# Patient Record
Sex: MALE | Race: ASIAN | Employment: FULL TIME | URBAN - METROPOLITAN AREA
[De-identification: names, ages, dates, MRNs, and addresses within clinical notes are randomized per-mention and may not be internally consistent; named-entity substitution may affect disease eponyms.]

---

## 2018-04-19 ENCOUNTER — OFFICE VISIT (OUTPATIENT)
Dept: FAMILY MEDICINE | Facility: CLINIC | Age: 28
End: 2018-04-19
Payer: COMMERCIAL

## 2018-04-19 VITALS
TEMPERATURE: 97.5 F | RESPIRATION RATE: 18 BRPM | HEIGHT: 69 IN | DIASTOLIC BLOOD PRESSURE: 78 MMHG | BODY MASS INDEX: 28.73 KG/M2 | SYSTOLIC BLOOD PRESSURE: 128 MMHG | OXYGEN SATURATION: 99 % | WEIGHT: 194 LBS | HEART RATE: 79 BPM

## 2018-04-19 DIAGNOSIS — Z00.00 ROUTINE HISTORY AND PHYSICAL EXAMINATION OF ADULT: Primary | ICD-10-CM

## 2018-04-19 DIAGNOSIS — J98.9 REACTIVE AIRWAY DISEASE THAT IS NOT ASTHMA: ICD-10-CM

## 2018-04-19 PROCEDURE — 99385 PREV VISIT NEW AGE 18-39: CPT | Performed by: INTERNAL MEDICINE

## 2018-04-19 RX ORDER — ALBUTEROL SULFATE 90 UG/1
2 AEROSOL, METERED RESPIRATORY (INHALATION) EVERY 6 HOURS PRN
Qty: 1 INHALER | Refills: 2 | Status: SHIPPED | OUTPATIENT
Start: 2018-04-19 | End: 2020-02-27

## 2018-04-19 RX ORDER — ALBUTEROL SULFATE 90 UG/1
2 AEROSOL, METERED RESPIRATORY (INHALATION) EVERY 6 HOURS
COMMUNITY
End: 2019-11-26

## 2018-04-19 ASSESSMENT — ENCOUNTER SYMPTOMS
BACK PAIN: 1
SORE THROAT: 0
HEADACHES: 0
ARTHRALGIAS: 0
NUMBNESS: 0
CONSTIPATION: 0
EYE ITCHING: 0
LIGHT-HEADEDNESS: 0
FATIGUE: 0
VOMITING: 0
NERVOUS/ANXIOUS: 0
NECK PAIN: 0
SHORTNESS OF BREATH: 0
NAUSEA: 0
DIZZINESS: 0
EYE PAIN: 0
COUGH: 1
MYALGIAS: 0
DIARRHEA: 0
DIFFICULTY URINATING: 0
FEVER: 0
EYE REDNESS: 0
ABDOMINAL PAIN: 1
WHEEZING: 0
SLEEP DISTURBANCE: 0
CHILLS: 0
BLOOD IN STOOL: 0
PALPITATIONS: 0
TROUBLE SWALLOWING: 0

## 2018-04-19 NOTE — PROGRESS NOTES
SUBJECTIVE:   CC: Skyler York is an 27 year old male who presents for preventative health visit.       Patient complains of nonproductive cough which specifically occurs whenever he goes out of his office to head towards the parking lot for his car.  Upon further questioning, this apparently started when the weather got colder (last year fall season).  As soon as he returns to a warmer environment, the cough resolves.  Denies shortness of breath or chest pain.  No night sweats, fever/chills, or weight loss.  He does have a history of childhood asthma.        Healthy Habits:    Do you get at least three servings of calcium containing foods daily (dairy, green leafy vegetables, etc.)? yes    Amount of exercise or daily activities, outside of work: 3 day(s) per week    Problems taking medications regularly No    Medication side effects: No    Have you had an eye exam in the past two years? yes    Do you see a dentist twice per year? yes    Do you have sleep apnea, excessive snoring or daytime drowsiness?no       Today's PHQ-2 Score:   PHQ-2 ( 1999 [employer]) 4/19/2018   Q1: Little interest or pleasure in doing things 0   Q2: Feeling down, depressed or hopeless 0   PHQ-2 Score 0       Abuse: Current or Past(Physical, Sexual or Emotional)- No  Do you feel safe in your environment - Yes     If you drink alcohol do you typically have >3 drinks per day or >7 drinks per week? No                      Reviewed orders with patient. Reviewed health maintenance and updated orders accordingly - Yes    Reviewed and updated as needed this visit by clinical staff  Tobacco  Allergies  Meds  Problems  Med Hx  Surg Hx  Fam Hx  Soc Hx        Reviewed and updated as needed this visit by Provider  Allergies  Meds  Problems        Past Medical History:   Diagnosis Date     Allergic rhinitis      Childhood asthma      Past Surgical History:   Procedure Laterality Date     NO HISTORY OF SURGERY         Family History   Problem  "Relation Age of Onset     Hypertension Mother      Colon Cancer Father      60's     DIABETES Paternal Uncle      Hypertension Paternal Uncle      Breast Cancer Paternal Aunt      Coronary Artery Disease No family hx of      CEREBROVASCULAR DISEASE No family hx of      Prostate Cancer No family hx of      Other Cancer No family hx of        Social History   Substance Use Topics     Smoking status: Never Smoker     Smokeless tobacco: Never Used     Alcohol use No       ROS:  Review of Systems   Constitutional: Negative for chills, fatigue and fever.   HENT: Negative for congestion, ear pain, hearing loss, sore throat and trouble swallowing.    Eyes: Positive for visual disturbance (corneal disorder -- follows up with eye specialist). Negative for pain, redness and itching.   Respiratory: Positive for cough (when out in cold wheather). Negative for shortness of breath and wheezing.    Cardiovascular: Negative for chest pain and palpitations.   Gastrointestinal: Positive for abdominal pain (when consuming certain dairy products). Negative for blood in stool, constipation, diarrhea, nausea and vomiting.   Genitourinary: Negative for difficulty urinating and testicular pain.   Musculoskeletal: Positive for back pain (occasional -- relieved by Aleve, has history of lumbar herniated disk, had PT in the past, not limiting daily activites). Negative for arthralgias, myalgias and neck pain.   Skin: Negative for rash.   Neurological: Negative for dizziness, light-headedness, numbness and headaches.   Psychiatric/Behavioral: Negative for sleep disturbance. The patient is not nervous/anxious.        OBJECTIVE:   /78 (BP Location: Right arm, Patient Position: Chair, Cuff Size: Adult Large)  Pulse 79  Temp 97.5  F (36.4  C) (Oral)  Resp 18  Ht 5' 9\" (1.753 m)  Wt 194 lb (88 kg)  SpO2 99%  BMI 28.65 kg/m2  EXAM:  Physical Exam   Constitutional: He is oriented to person, place, and time. No distress.   HENT:   Right " Ear: External ear normal.   Left Ear: External ear normal.   Nose: Nose normal.   Mouth/Throat: Oropharynx is clear and moist. No oropharyngeal exudate.   Eyes: Conjunctivae are normal. Pupils are equal, round, and reactive to light.   Neck: Normal range of motion. Neck supple. No thyromegaly present.   Cardiovascular: Normal rate, regular rhythm and normal heart sounds.    Pulmonary/Chest: Effort normal and breath sounds normal. No respiratory distress.   Abdominal: Soft. There is no tenderness.   Genitourinary: Penis normal.   Musculoskeletal: Normal range of motion. He exhibits no edema or tenderness.   Lymphadenopathy:     He has no cervical adenopathy.   Neurological: He is alert and oriented to person, place, and time. He has normal reflexes. Coordination normal.   Psychiatric: He has a normal mood and affect. Judgment normal.   Vitals reviewed.      ASSESSMENT/PLAN:       ICD-10-CM    1. Routine history and physical examination of adult Z00.00    2. Reactive airway disease that is not asthma R09.89 albuterol (PROAIR HFA/PROVENTIL HFA/VENTOLIN HFA) 108 (90 Base) MCG/ACT Inhaler       Patient Instructions   Use prescribed inhaler during your coughing spells.    Call doctor if your cough persist/worsens, or if you develop new symptoms or side effects from the inhaler.    Maintain low fat/calorie diet and regular exercise.    Follow up every 2 years or as needed.        Preventive Health Recommendations  Male Ages 26 - 39    Yearly exam:             See your health care provider every year in order to  o   Review health changes.   o   Discuss preventive care.    o   Review your medicines if your doctor has prescribed any.    You should be tested each year for STDs (sexually transmitted diseases), if you re at risk.     After age 35, talk to your provider about cholesterol testing. If you are at risk for heart disease, have your cholesterol tested at least every 5 years.     If you are at risk for diabetes, you  "should have a diabetes test (fasting glucose).  Shots: Get a flu shot each year. Get a tetanus shot every 10 years.     Nutrition:    Eat at least 5 servings of fruits and vegetables daily.     Eat whole-grain bread, whole-wheat pasta and brown rice instead of white grains and rice.     Talk to your provider about Calcium and Vitamin D.     Lifestyle    Exercise for at least 150 minutes a week (30 minutes a day, 5 days a week). This will help you control your weight and prevent disease.     Limit alcohol to one drink per day.     No smoking.     Wear sunscreen to prevent skin cancer.     See your dentist every six months for an exam and cleaning.       COUNSELING:  Reviewed preventive health counseling, as reflected in patient instructions       reports that he has never smoked. He has never used smokeless tobacco.    Estimated body mass index is 28.65 kg/(m^2) as calculated from the following:    Height as of this encounter: 5' 9\" (1.753 m).    Weight as of this encounter: 194 lb (88 kg).       Counseling Resources:  ATP IV Guidelines  Pooled Cohorts Equation Calculator  FRAX Risk Assessment  ICSI Preventive Guidelines  Dietary Guidelines for Americans, 2010  USDA's MyPlate  ASA Prophylaxis  Lung CA Screening    Gal Lopez MD  [facility]    "

## 2018-04-19 NOTE — MR AVS SNAPSHOT
After Visit Summary   4/19/2018    Skyler York    MRN: [id number]           Patient Information     Date Of Birth          1990        Visit Information        Provider Department      4/19/2018 4:00 PM Gal Lopez MD [facility]        Today's Diagnoses     Routine history and physical examination of adult    -  1    Reactive airway disease that is not asthma          Care Instructions    Use prescribed inhaler during your coughing spells.    Call doctor if your cough persist/worsens, or if you develop new symptoms or side effects from the inhaler.    Maintain low fat/calorie diet and regular exercise.    Follow up every 2 years or as needed.        Preventive Health Recommendations  Male Ages 26 - 39    Yearly exam:             See your health care provider every year in order to  o   Review health changes.   o   Discuss preventive care.    o   Review your medicines if your doctor has prescribed any.    You should be tested each year for STDs (sexually transmitted diseases), if you re at risk.     After age 35, talk to your provider about cholesterol testing. If you are at risk for heart disease, have your cholesterol tested at least every 5 years.     If you are at risk for diabetes, you should have a diabetes test (fasting glucose).  Shots: Get a flu shot each year. Get a tetanus shot every 10 years.     Nutrition:    Eat at least 5 servings of fruits and vegetables daily.     Eat whole-grain bread, whole-wheat pasta and brown rice instead of white grains and rice.     Talk to your provider about Calcium and Vitamin D.     Lifestyle    Exercise for at least 150 minutes a week (30 minutes a day, 5 days a week). This will help you control your weight and prevent disease.     Limit alcohol to one drink per day.     No smoking.     Wear sunscreen to prevent skin cancer.     See your dentist every six months for an exam and cleaning.             Follow-ups after  "your visit        Who to contact     If you have questions or need follow up information about today's clinic visit or your schedule please contact [facility] directly at [phone].  Normal or non-critical lab and imaging results will be communicated to you by MyChart, letter or phone within 4 business days after the clinic has received the results. If you do not hear from us within 7 days, please contact the clinic through MyChart or phone. If you have a critical or abnormal lab result, we will notify you by phone as soon as possible.  Submit refill requests through MyRoll or call your pharmacy and they will forward the refill request to us. Please allow 3 business days for your refill to be completed.          Additional Information About Your Visit        [facility]     MyRoll lets you send messages to your doctor, view your test results, renew your prescriptions, schedule appointments and more. To sign up, go to [email or web address] . Click on \"Log in\" on the left side of the screen, which will take you to the Welcome page. Then click on \"Sign up Now\" on the right side of the page.     You will be asked to enter the access code listed below, as well as some personal information. Please follow the directions to create your username and password.     Your access code is: [email or web address]  Expires: 2018  4:44 PM     Your access code will  in 90 days. If you need help or a new code, please call your [facility] clinic or [phone].        Care EveryWhere ID     This is your Care EveryWhere ID. This could be used by other organizations to access your [facility] medical records  [id number]        Your Vitals Were     Pulse Temperature Respirations Height Pulse Oximetry BMI (Body Mass Index)    79 97.5  F (36.4  C) (Oral) 18 5' 9\" (1.753 m) 99% 28.65 kg/m2       Blood Pressure from Last 3 Encounters:   18 128/78    Weight from Last 3 Encounters:   18 194 lb (88 kg)    "           Today, you had the following     No orders found for display         Today's Medication Changes          These changes are accurate as of 4/19/18  4:44 PM.  If you have any questions, ask your nurse or doctor.               These medicines have changed or have updated prescriptions.        Dose/Directions    * albuterol 108 (90 Base) MCG/ACT Inhaler   Commonly known as:  PROAIR HFA/PROVENTIL HFA/VENTOLIN HFA   This may have changed:  Another medication with the same name was added. Make sure you understand how and when to take each.   Changed by:  Gal Lopez MD        Dose:  2 puff   Inhale 2 puffs into the lungs every 6 hours   Refills:  0       * albuterol 108 (90 Base) MCG/ACT Inhaler   Commonly known as:  PROAIR HFA/PROVENTIL HFA/VENTOLIN HFA   This may have changed:  You were already taking a medication with the same name, and this prescription was added. Make sure you understand how and when to take each.   Used for:  Reactive airway disease that is not asthma   Changed by:  Gal Lopez MD        Dose:  2 puff   Inhale 2 puffs into the lungs every 6 hours as needed for shortness of breath / dyspnea or wheezing   Quantity:  1 Inhaler   Refills:  2       * Notice:  This list has 2 medication(s) that are the same as other medications prescribed for you. Read the directions carefully, and ask your doctor or other care provider to review them with you.         Where to get your medicines      These medications were sent to [facility] [address], [place] [address]  [address], [address] [address]     Phone:  [phone]     albuterol 108 (90 Base) MCG/ACT Inhaler                Primary Care Provider Fax #    Physician No Ref-Primary [phone]       No address on file        Equal Access to Services     OMAR TORRES AH: delisa Glynn, jadiel masters  moncho poeshahidramírez cabralesjeffjeremy rustam. So [facility] [place] [phone].    ATENCIÓN: Si jorgela allen, tiene a pineda disposición servicios gratuitos de asistencia lingüística. Shaun al [phone].    We comply with applicable federal civil rights laws and [place] laws. We do not discriminate on the basis of race, color, national origin, age, disability, sex, sexual orientation, or gender identity.            Thank you!     Thank you for choosing [facility]  for your care. Our goal is always to provide you with excellent care. Hearing back from our patients is one way we can continue to improve our services. Please take a few minutes to complete the written survey that you may receive in the mail after your visit with us. Thank you!             Your Updated Medication List - Protect others around you: Learn how to safely use, store and throw away your medicines at www.disposemymeds.org.          This list is accurate as of 4/19/18  4:44 PM.  Always use your most recent med list.                   Brand Name Dispense Instructions for use Diagnosis    * albuterol 108 (90 Base) MCG/ACT Inhaler    PROAIR HFA/PROVENTIL HFA/VENTOLIN HFA     Inhale 2 puffs into the lungs every 6 hours        * albuterol 108 (90 Base) MCG/ACT Inhaler    PROAIR HFA/PROVENTIL HFA/VENTOLIN HFA    1 Inhaler    Inhale 2 puffs into the lungs every 6 hours as needed for shortness of breath / dyspnea or wheezing    Reactive airway disease that is not asthma       * Notice:  This list has 2 medication(s) that are the same as other medications prescribed for you. Read the directions carefully, and ask your doctor or other care provider to review them with you.

## 2018-04-19 NOTE — NURSING NOTE
"Chief Complaint   Patient presents with     Physical     Not Fasting       Initial /78 (BP Location: Right arm, Patient Position: Chair, Cuff Size: Adult Large)  Pulse 79  Temp 97.5  F (36.4  C) (Oral)  Resp 18  Ht 5' 9\" (1.753 m)  Wt 194 lb (88 kg)  SpO2 99%  BMI 28.65 kg/m2 Estimated body mass index is 28.65 kg/(m^2) as calculated from the following:    Height as of this encounter: 5' 9\" (1.753 m).    Weight as of this encounter: 194 lb (88 kg).  Medication Reconciliation: complete   Sara Lee CMA (AAMA)      "

## 2018-04-19 NOTE — PATIENT INSTRUCTIONS
Use prescribed inhaler during your coughing spells.    Call doctor if your cough persist/worsens, or if you develop new symptoms or side effects from the inhaler.    Maintain low fat/calorie diet and regular exercise.    Follow up every 2 years or as needed.        Preventive Health Recommendations  Male Ages 26 - 39    Yearly exam:             See your health care provider every year in order to  o   Review health changes.   o   Discuss preventive care.    o   Review your medicines if your doctor has prescribed any.    You should be tested each year for STDs (sexually transmitted diseases), if you re at risk.     After age 35, talk to your provider about cholesterol testing. If you are at risk for heart disease, have your cholesterol tested at least every 5 years.     If you are at risk for diabetes, you should have a diabetes test (fasting glucose).  Shots: Get a flu shot each year. Get a tetanus shot every 10 years.     Nutrition:    Eat at least 5 servings of fruits and vegetables daily.     Eat whole-grain bread, whole-wheat pasta and brown rice instead of white grains and rice.     Talk to your provider about Calcium and Vitamin D.     Lifestyle    Exercise for at least 150 minutes a week (30 minutes a day, 5 days a week). This will help you control your weight and prevent disease.     Limit alcohol to one drink per day.     No smoking.     Wear sunscreen to prevent skin cancer.     See your dentist every six months for an exam and cleaning.

## 2019-08-22 ENCOUNTER — OFFICE VISIT (OUTPATIENT)
Dept: URGENT CARE | Facility: URGENT CARE | Age: 29
End: 2019-08-22
Payer: COMMERCIAL

## 2019-08-22 VITALS
OXYGEN SATURATION: 99 % | BODY MASS INDEX: 28.21 KG/M2 | HEART RATE: 104 BPM | WEIGHT: 191 LBS | TEMPERATURE: 100.7 F | SYSTOLIC BLOOD PRESSURE: 130 MMHG | DIASTOLIC BLOOD PRESSURE: 88 MMHG

## 2019-08-22 DIAGNOSIS — J01.80 OTHER ACUTE SINUSITIS, RECURRENCE NOT SPECIFIED: ICD-10-CM

## 2019-08-22 DIAGNOSIS — H65.02 ACUTE SEROUS OTITIS MEDIA OF LEFT EAR, RECURRENCE NOT SPECIFIED: ICD-10-CM

## 2019-08-22 DIAGNOSIS — R07.0 THROAT PAIN: Primary | ICD-10-CM

## 2019-08-22 LAB
DEPRECATED S PYO AG THROAT QL EIA: NORMAL
SPECIMEN SOURCE: NORMAL

## 2019-08-22 PROCEDURE — 87880 STREP A ASSAY W/OPTIC: CPT | Performed by: FAMILY MEDICINE

## 2019-08-22 PROCEDURE — 87081 CULTURE SCREEN ONLY: CPT | Performed by: FAMILY MEDICINE

## 2019-08-22 PROCEDURE — 99213 OFFICE O/P EST LOW 20 MIN: CPT | Performed by: FAMILY MEDICINE

## 2019-08-22 RX ORDER — ACETAMINOPHEN 325 MG/1
325-650 TABLET ORAL EVERY 6 HOURS PRN
COMMUNITY
End: 2020-10-10

## 2019-08-22 RX ORDER — AMOXICILLIN 875 MG
875 TABLET ORAL 2 TIMES DAILY
Qty: 20 TABLET | Refills: 0 | Status: SHIPPED | OUTPATIENT
Start: 2019-08-22 | End: 2019-11-26

## 2019-08-22 NOTE — PATIENT INSTRUCTIONS
Patient Education     Sinusitis (Antibiotic Treatment)    The sinuses are air-filled spaces within the bones of the face. They connect to the inside of the nose. Sinusitis is an inflammation of the tissue that lines the sinuses. Sinusitis can occur during a cold. It can also happen due to allergies to pollens and other particles in the air. Sinusitis can cause symptoms of sinus congestion and a feeling of fullness. A sinus infection causes fever, headache, and facial pain. There is often green or yellow fluid draining from the nose or into the back of the throat (post-nasal drip). You have been given antibiotics to treat this condition.  Home care    Take the full course of antibiotics as instructed. Do not stop taking them, even when you feel better.    Drink plenty of water, hot tea, and other liquids. This may help thin nasal mucus. It also may help your sinuses drain fluids.    Heat may help soothe painful areas of your face. Use a towel soaked in hot water. Or,  the shower and direct the warm spray onto your face. Using a vaporizer along with a menthol rub at night may also help soothe symptoms.     An expectorant with guaifenesin may help thin nasal mucus and help your sinuses drain fluids.    You can use an over-the-counter decongestant, unless a similar medicine was prescribed to you. Nasal sprays work the fastest. Use one that contains phenylephrine or oxymetazoline. First blow your nose gently. Then use the spray. Do not use these medicines more often than directed on the label. If you do, your symptoms may get worse. You may also take pills that contain pseudoephedrine. Don t use products that combine multiple medicines. This is because side effects may be increased. Read labels. You can also ask the pharmacist for help. (People with high blood pressure should not use decongestants. They can raise blood pressure.)    Over-the-counter antihistamines may help if allergies contributed to your  sinusitis.      Do not use nasal rinses or irrigation during an acute sinus infection, unless your healthcare provider tells you to. Rinsing may spread the infection to other areas in your sinuses.    Use acetaminophen or ibuprofen to control pain, unless another pain medicine was prescribed to you. If you have chronic liver or kidney disease or ever had a stomach ulcer, talk with your healthcare provider before using these medicines. (Aspirin should never be taken by anyone under age 18 who is ill with a fever. It may cause severe liver damage.)    Don't smoke. This can make symptoms worse.  Follow-up care  Follow up with your healthcare provider or our staff if you are not better in 1 week.  When to seek medical advice  Call your healthcare provider if any of these occur:    Facial pain or headache that gets worse    Stiff neck    Unusual drowsiness or confusion    Swelling of your forehead or eyelids    Vision problems, such as blurred or double vision    Fever of 100.4 F (38 C) or higher, or as directed by your healthcare provider    Seizure    Breathing problems    Symptoms don't go away in 10 days  Prevention  Here are steps you can take to help prevent an infection:    Keep good hand washing habits.    Don t have close contact with people who have sore throats, colds, or other upper respiratory infections.    Don t smoke, and stay away from secondhand smoke.    Stay up to date with of your vaccines.  Date Last Reviewed: 11/1/2017 2000-2018 [employer]. [address] [place] [address]. All rights reserved. This information is not intended as a substitute for professional medical care. Always follow your healthcare professional's instructions.           Patient Education     Middle Ear Infection (Adult)  You have an infection of the middle ear, the space behind the eardrum. This is also called acute otitis media (AOM). Sometimes it is caused by the common cold. This is because  congestion can block the internal passage (eustachian tube) that drains fluid from the middle ear. When the middle ear fills with fluid, bacteria can grow there and cause an infection. Oral antibiotics are used to treat this illness, not ear drops. Symptoms usually start to improve within 1 to 2 days of treatment.    Home care  The following are general care guidelines:    Finish all of the antibiotic medicine given, even though you may feel better after the first few days.    You may use over-the-counter medicine, such as acetaminophen or ibuprofen, to control pain and fever, unless something else was prescribed. If you have chronic liver or kidney disease or have ever had a stomach ulcer or gastrointestinal bleeding, talk with your healthcare provider before using these medicines. Do not give aspirin to anyone under 18 years of age who has a fever. It may cause severe illness or death.  Follow-up care  Follow up with your healthcare provider, or as advised, in 2 weeks if all symptoms have not gotten better, or if hearing doesn't go back to normal within 1 month.  When to seek medical advice  Call your healthcare provider right away if any of these occur:    Ear pain gets worse or does not improve after 3 days of treatment    Unusual drowsiness or confusion    Neck pain, stiff neck, or headache    Fluid or blood draining from the ear canal    Fever of 100.4 F (38 C) or as advised     Seizure  Date Last Reviewed: 6/1/2016 2000-2018 [employer]. [address] [place] [address]. All rights reserved. This information is not intended as a substitute for professional medical care. Always follow your healthcare professional's instructions.

## 2019-08-23 LAB
BACTERIA SPEC CULT: NORMAL
SPECIMEN SOURCE: NORMAL

## 2019-08-26 NOTE — PROGRESS NOTES
SUBJECTIVE:  Skyler York, a 28 year old male scheduled an appointment to discuss the following issues:     Throat pain  Other acute sinusitis, recurrence not specified  Acute serous otitis media of left ear, recurrence not specified    Medical, social, surgical, and family histories reviewed.    Urgent Care    Pharyngitis (sore throat,fever,body aches,chills,headache,sinus pain)   Patient has had sore throat and fever up to 101.4  F, myalgia, nausea, decreased appetite.  No sick exposure, but he has been riding the bus to work.  No history of diabetes.  Patient does have asthma, last used inhaler 6 months ago.  No cough.    ROS:  See HPI.  No nausea/vomiting.   No chest pain/SOB.  No BM/urine problems.  No dizziness or syncope.      OBJECTIVE:  /88   Pulse 104   Temp 100.7  F (38.2  C) (Oral)   Wt 86.6 kg (191 lb)   SpO2 99%   BMI 28.21 kg/m    EXAM:  GENERAL APPEARANCE: alert and mild distress; low-grade temp, mildly tachycardic, not hypotensive, not septic, moist mucous membrane; no cyanosis or accessory muscle use; no meningeal signs  EYES: Eyes grossly normal to inspection, PERRL and conjunctivae and sclerae normal  HENT: ear canals normal; left tympanic membrane erythematous and somewhat opaque; right tympanic membrane normal; inflamed nasal mucosa with bilateral maxillary sinus tenderness; mouth without ulcers or lesions; erythematous throat but no exudate  NECK: no adenopathy, no asymmetry, masses, or scars and neck normal to palpation  RESP: lungs clear to auscultation - no rales, rhonchi or wheezes  CV: regular rates and rhythm, normal S1 S2, no S3 or S4 and no murmur, click or rub  LYMPHATICS: no cervical adenopathy  ABDOMEN: soft, nontender, without hepatosplenomegaly or masses and bowel sounds normal  MS: extremities normal- no gross deformities noted  SKIN: no suspicious lesions or rashes  NEURO: Normal strength and tone, mentation intact and speech normal    ASSESSMENT/PLAN:  (R07.0) Throat  pain  (primary encounter diagnosis)  Comment: strep negative  Plan: Rapid strep screen, Beta strep group A culture        (J01.80) Other acute sinusitis, recurrence not specified  Plan: amoxicillin (AMOXIL) 875 MG tablet       (H65.02) Acute serous otitis media of left ear, recurrence not specified  Plan: amoxicillin (AMOXIL) 875 MG tablet  Care instructions given.  Fluids/rest.  Pt to f/up PCP if no improvement or worsening.  Warning signs and symptoms explained.

## 2019-11-26 ENCOUNTER — OFFICE VISIT (OUTPATIENT)
Dept: FAMILY MEDICINE | Facility: CLINIC | Age: 29
End: 2019-11-26
Payer: COMMERCIAL

## 2019-11-26 VITALS
HEART RATE: 84 BPM | WEIGHT: 192 LBS | TEMPERATURE: 98.5 F | BODY MASS INDEX: 28.44 KG/M2 | OXYGEN SATURATION: 100 % | SYSTOLIC BLOOD PRESSURE: 120 MMHG | HEIGHT: 69 IN | DIASTOLIC BLOOD PRESSURE: 72 MMHG

## 2019-11-26 DIAGNOSIS — Z00.00 ROUTINE HISTORY AND PHYSICAL EXAMINATION OF ADULT: Primary | ICD-10-CM

## 2019-11-26 LAB
BASOPHILS # BLD AUTO: 0 10E9/L (ref 0–0.2)
BASOPHILS NFR BLD AUTO: 0.2 %
DIFFERENTIAL METHOD BLD: NORMAL
EOSINOPHIL # BLD AUTO: 0.2 10E9/L (ref 0–0.7)
EOSINOPHIL NFR BLD AUTO: 2.6 %
ERYTHROCYTE [DISTWIDTH] IN BLOOD BY AUTOMATED COUNT: 12.9 % (ref 10–15)
HCT VFR BLD AUTO: 42.3 % (ref 40–53)
HGB BLD-MCNC: 14.1 G/DL (ref 13.3–17.7)
LYMPHOCYTES # BLD AUTO: 3.3 10E9/L (ref 0.8–5.3)
LYMPHOCYTES NFR BLD AUTO: 40.1 %
MCH RBC QN AUTO: 29.3 PG (ref 26.5–33)
MCHC RBC AUTO-ENTMCNC: 33.3 G/DL (ref 31.5–36.5)
MCV RBC AUTO: 88 FL (ref 78–100)
MONOCYTES # BLD AUTO: 0.7 10E9/L (ref 0–1.3)
MONOCYTES NFR BLD AUTO: 8.7 %
NEUTROPHILS # BLD AUTO: 3.9 10E9/L (ref 1.6–8.3)
NEUTROPHILS NFR BLD AUTO: 48.4 %
PLATELET # BLD AUTO: 232 10E9/L (ref 150–450)
RBC # BLD AUTO: 4.81 10E12/L (ref 4.4–5.9)
WBC # BLD AUTO: 8.1 10E9/L (ref 4–11)

## 2019-11-26 PROCEDURE — 99395 PREV VISIT EST AGE 18-39: CPT | Performed by: INTERNAL MEDICINE

## 2019-11-26 PROCEDURE — 36415 COLL VENOUS BLD VENIPUNCTURE: CPT | Performed by: INTERNAL MEDICINE

## 2019-11-26 PROCEDURE — 85025 COMPLETE CBC W/AUTO DIFF WBC: CPT | Performed by: INTERNAL MEDICINE

## 2019-11-26 PROCEDURE — 80053 COMPREHEN METABOLIC PANEL: CPT | Performed by: INTERNAL MEDICINE

## 2019-11-26 ASSESSMENT — MIFFLIN-ST. JEOR: SCORE: 1826.29

## 2019-11-26 NOTE — PROGRESS NOTES
SUBJECTIVE:   CC: Skyler York is an 29 year old male who presents for preventative health visit.       Healthy Habits:     Getting at least 3 servings of Calcium per day:  Yes    Bi-annual eye exam:  Yes    Dental care twice a year:  Yes    Sleep apnea or symptoms of sleep apnea:  None    Diet:  Regular (no restrictions)    Frequency of exercise:  4-5 days/week    Duration of exercise:  45-60 minutes    Taking medications regularly:  Not Applicable    Barriers to taking medications:  None    Medication side effects:  None    PHQ-2 Total Score: 0    Additional concerns today:  No      Today's PHQ-2 Score:   PHQ-2 ( 1999 Pf[employer]) 11/26/2019   Q1: Little interest or pleasure in doing things 0   Q2: Feeling down, depressed or hopeless 0   PHQ-2 Score 0       Abuse: Current or Past(Physical, Sexual or Emotional)- No  Do you feel safe in your environment? Yes        Social History     Tobacco Use     Smoking status: Never Smoker     Smokeless tobacco: Never Used   Substance Use Topics     Alcohol use: No         No flowsheet data found.    Last PSA: No results found for: PSA    Reviewed orders with patient. Reviewed health maintenance and updated orders accordingly - Yes      Reviewed and updated as needed this visit by clinical staff  Tobacco  Allergies  Meds  Problems  Med Hx  Surg Hx  Soc Hx        Reviewed and updated as needed this visit by Provider  Allergies  Meds  Problems  Med Hx  Surg Hx        Past Medical History:   Diagnosis Date     Allergic rhinitis      Childhood asthma        Past Surgical History:   Procedure Laterality Date     NO HISTORY OF SURGERY         Family History   Problem Relation Age of Onset     Hypertension Mother      Colon Cancer Father         60's     Diabetes Paternal Uncle      Hypertension Paternal Uncle      Breast Cancer Paternal Aunt      Coronary Artery Disease No family hx of      Cerebrovascular Disease No family hx of      Prostate Cancer No family hx of      Other  "Cancer No family hx of        Social History     Tobacco Use     Smoking status: Never Smoker     Smokeless tobacco: Never Used   Substance Use Topics     Alcohol use: No       Review of Systems   Constitutional: Negative for chills, fatigue and fever.   HENT: Negative for congestion, ear pain, hearing loss, sore throat and trouble swallowing.    Eyes: Negative for pain and visual disturbance.   Respiratory: Negative for cough and shortness of breath.    Cardiovascular: Negative for chest pain and palpitations.   Gastrointestinal: Negative for abdominal pain, blood in stool, constipation, diarrhea, nausea and vomiting.   Genitourinary: Negative for difficulty urinating and testicular pain.   Musculoskeletal: Negative for arthralgias, back pain, myalgias and neck pain.   Skin: Negative for rash.   Neurological: Negative for dizziness, light-headedness, numbness and headaches.   Psychiatric/Behavioral: Negative for sleep disturbance. The patient is not nervous/anxious.        OBJECTIVE:   /72 (BP Location: Right arm, Patient Position: Chair, Cuff Size: Adult Regular)   Pulse 84   Temp 98.5  F (36.9  C) (Tympanic)   Ht 1.753 m (5' 9\")   Wt 87.1 kg (192 lb)   SpO2 100%   BMI 28.35 kg/m      Physical Exam  Vitals signs and nursing note reviewed.   Constitutional:       General: He is not in acute distress.  HENT:      Right Ear: External ear normal.      Left Ear: External ear normal.   Eyes:      Conjunctiva/sclera: Conjunctivae normal.      Pupils: Pupils are equal, round, and reactive to light.   Neck:      Thyroid: No thyromegaly.   Cardiovascular:      Rate and Rhythm: Normal rate and regular rhythm.      Heart sounds: Normal heart sounds.   Pulmonary:      Effort: Pulmonary effort is normal. No respiratory distress.      Breath sounds: Normal breath sounds.   Abdominal:      Palpations: Abdomen is soft.      Tenderness: There is no abdominal tenderness.   Genitourinary:     Penis: No discharge.  " "  Musculoskeletal: Normal range of motion.         General: No tenderness.   Lymphadenopathy:      Cervical: No cervical adenopathy.   Skin:     Findings: No rash.   Neurological:      Mental Status: He is alert and oriented to person, place, and time.      Coordination: Coordination normal.       ASSESSMENT/PLAN:       ICD-10-CM    1. Routine history and physical examination of adult Z00.00 CBC with platelets differential     Comprehensive metabolic panel       Patient Instructions   Inform doctor if your bilateral knee and left shoulder pain persist/worsens, or if you develop new symptoms.    Maintain low fat/calorie diet and regular exercise.    Follow up yearly or as needed.      COUNSELING:   Reviewed preventive health counseling, as reflected in patient instructions    Estimated body mass index is 28.35 kg/m  as calculated from the following:    Height as of this encounter: 1.753 m (5' 9\").    Weight as of this encounter: 87.1 kg (192 lb).     Weight management plan: Discussed healthy diet and exercise guidelines     reports that he has never smoked. He has never used smokeless tobacco.      Counseling Resources:  ATP IV Guidelines  Pooled Cohorts Equation Calculator  FRAX Risk Assessment  ICSI Preventive Guidelines  Dietary Guidelines for Americans, 2010  USDA's MyPlate  ASA Prophylaxis  Lung CA Screening    Gal Lopez MD  [facility]  "

## 2019-11-26 NOTE — PATIENT INSTRUCTIONS
Inform doctor if your bilateral knee and left shoulder pain persist/worsens, or if you develop new symptoms.    Maintain low fat/calorie diet and regular exercise.    Follow up yearly or as needed.

## 2019-11-27 LAB
ALBUMIN SERPL-MCNC: 4 G/DL (ref 3.4–5)
ALP SERPL-CCNC: 80 U/L (ref 40–150)
ALT SERPL W P-5'-P-CCNC: 26 U/L (ref 0–70)
ANION GAP SERPL CALCULATED.3IONS-SCNC: 7 MMOL/L (ref 3–14)
AST SERPL W P-5'-P-CCNC: 21 U/L (ref 0–45)
BILIRUB SERPL-MCNC: 0.4 MG/DL (ref 0.2–1.3)
BUN SERPL-MCNC: 14 MG/DL (ref 7–30)
CALCIUM SERPL-MCNC: 8.9 MG/DL (ref 8.5–10.1)
CHLORIDE SERPL-SCNC: 107 MMOL/L (ref 94–109)
CO2 SERPL-SCNC: 26 MMOL/L (ref 20–32)
CREAT SERPL-MCNC: 0.97 MG/DL (ref 0.66–1.25)
GFR SERPL CREATININE-BSD FRML MDRD: >90 ML/MIN/{1.73_M2}
GLUCOSE SERPL-MCNC: 89 MG/DL (ref 70–99)
POTASSIUM SERPL-SCNC: 3.7 MMOL/L (ref 3.4–5.3)
PROT SERPL-MCNC: 7.4 G/DL (ref 6.8–8.8)
SODIUM SERPL-SCNC: 140 MMOL/L (ref 133–144)

## 2019-11-27 ASSESSMENT — ENCOUNTER SYMPTOMS
SHORTNESS OF BREATH: 0
SLEEP DISTURBANCE: 0
HEADACHES: 0
LIGHT-HEADEDNESS: 0
BLOOD IN STOOL: 0
NECK PAIN: 0
TROUBLE SWALLOWING: 0
FATIGUE: 0
VOMITING: 0
EYE PAIN: 0
DIFFICULTY URINATING: 0
ABDOMINAL PAIN: 0
CONSTIPATION: 0
NUMBNESS: 0
MYALGIAS: 0
DIZZINESS: 0
ARTHRALGIAS: 0
NERVOUS/ANXIOUS: 0
COUGH: 0
DIARRHEA: 0
BACK PAIN: 0
SORE THROAT: 0
FEVER: 0
NAUSEA: 0
CHILLS: 0
PALPITATIONS: 0

## 2020-02-27 ENCOUNTER — OFFICE VISIT (OUTPATIENT)
Dept: FAMILY MEDICINE | Facility: CLINIC | Age: 30
End: 2020-02-27
Payer: COMMERCIAL

## 2020-02-27 ENCOUNTER — TELEPHONE (OUTPATIENT)
Dept: FAMILY MEDICINE | Facility: CLINIC | Age: 30
End: 2020-02-27

## 2020-02-27 VITALS
OXYGEN SATURATION: 99 % | HEART RATE: 72 BPM | HEIGHT: 69 IN | DIASTOLIC BLOOD PRESSURE: 71 MMHG | BODY MASS INDEX: 28.29 KG/M2 | TEMPERATURE: 98.5 F | WEIGHT: 191 LBS | SYSTOLIC BLOOD PRESSURE: 116 MMHG

## 2020-02-27 DIAGNOSIS — R20.2 PARESTHESIAS: Primary | ICD-10-CM

## 2020-02-27 LAB — VIT B12 SERPL-MCNC: 411 PG/ML (ref 193–986)

## 2020-02-27 PROCEDURE — 36415 COLL VENOUS BLD VENIPUNCTURE: CPT | Performed by: NURSE PRACTITIONER

## 2020-02-27 PROCEDURE — 99214 OFFICE O/P EST MOD 30 MIN: CPT | Performed by: NURSE PRACTITIONER

## 2020-02-27 PROCEDURE — 82607 VITAMIN B-12: CPT | Performed by: NURSE PRACTITIONER

## 2020-02-27 PROCEDURE — 82306 VITAMIN D 25 HYDROXY: CPT | Performed by: NURSE PRACTITIONER

## 2020-02-27 ASSESSMENT — MIFFLIN-ST. JEOR: SCORE: 1821.75

## 2020-02-27 NOTE — PROGRESS NOTES
"Subjective     Skyler York is a 29 year old male who presents to clinic today for the following health issues:    HPI       Chief Complaint   Patient presents with     Numbness     Intermittent tingling and numbness in various locations on body--primarily on the left side, for 4 days       Intermittent tingling of left side of body  Had a few episodes of the right   Still able to do all activities and exercise without issues  The first day 2/24 the left ear felt itchy but then noticed it was actually numb. Then noticed it was in the face. This lasted about 1 day. Wasn't able to feel the wind on the face. Rated it as a 1/10   Then got the tingling sensation of thumb and 2nd finger on day 2 that has been on and off   That day had a crawling sensation of his whole body   Took allegra but that didn't help   Woke up during the night that next night and his left had was half asleep   Yesterday had the intermittent thumb and 2nd finger tingling  Now today the left anterior thigh feels different   Today no symptoms onhead and hands but has an \"off feeling\" above the left knee and top of left foot  No headaches, dizziness, change of vision, injury, neck pain   No similar past   No significant family history. Family history of diabetes   Last travel was [place] 2 months ago   Doesn't feel anxious   Has had a little yellow phlegm in back of throat the last couple weeks and also nose a little nasal congestion.   Has history of sciatica on right   Sleeps great   Has a sit stand desk so stands half the time   No rashes   Kept track of foods also but didn't find a correlation. Usually eats pretty healthy   Doesn't take any supplements      Past Medical History:   Diagnosis Date     Allergic rhinitis      Childhood asthma      Family History   Problem Relation Age of Onset     Hypertension Mother      Colon Cancer Father         60's     Diabetes Paternal Uncle      Hypertension Paternal Uncle      Breast Cancer Paternal Aunt      " "Coronary Artery Disease No family hx of      Cerebrovascular Disease No family hx of      Prostate Cancer No family hx of      Other Cancer No family hx of      Past Surgical History:   Procedure Laterality Date     NO HISTORY OF SURGERY       Social History     Tobacco Use     Smoking status: Never Smoker     Smokeless tobacco: Never Used   Substance Use Topics     Alcohol use: No     Current Outpatient Medications   Medication Sig Dispense Refill     acetaminophen (TYLENOL) 325 MG tablet Take 325-650 mg by mouth every 6 hours as needed for mild pain       Allergies   Allergen Reactions     No Known Allergies        Reviewed and updated as needed this visit by clinical staff and provider     Review of Systems   Detailed as above         Objective    /71 (BP Location: Left arm, Patient Position: Sitting, Cuff Size: Adult Large)   Pulse 72   Temp 98.5  F (36.9  C) (Oral)   Ht 1.753 m (5' 9\")   Wt 86.6 kg (191 lb)   SpO2 99%   BMI 28.21 kg/m    There is no height or weight on file to calculate BMI.  Physical Exam  Constitutional:       Appearance: Normal appearance.   HENT:      Head: Normocephalic.   Eyes:      Conjunctiva/sclera: Conjunctivae normal.   Neck:      Musculoskeletal: Normal range of motion.   Cardiovascular:      Rate and Rhythm: Normal rate.   Pulmonary:      Effort: Pulmonary effort is normal.   Musculoskeletal: Normal range of motion.         General: No swelling.   Skin:     General: Skin is warm and dry.      Findings: No erythema or rash.   Neurological:      General: No focal deficit present.      Mental Status: He is alert.      Motor: No weakness.      Coordination: Coordination normal.   Psychiatric:         Mood and Affect: Mood normal.            Diagnostic Test Results:  Labs reviewed in Epic  Results for orders placed or performed in visit on 02/27/20   Vitamin D Deficiency     Status: Abnormal   Result Value Ref Range    Vitamin D Deficiency screening 11 (L) 20 - 75 ug/L "   Vitamin B12     Status: None   Result Value Ref Range    Vitamin B12 411 193 - 986 pg/mL           Assessment & Plan       ICD-10-CM    1. Paresthesias R20.2 Vitamin D Deficiency     Vitamin B12     NEUROLOGY ADULT REFERRAL      interesting case of scattered paresthesias mainly of left side. Concern for central etiology. Will check basic labs per above and have him follow-up with neuro for further eval.       HANNY Galvez CNP  [facility]

## 2020-02-27 NOTE — TELEPHONE ENCOUNTER
Patient calling with complaint of intermittent numbness and tingling left side.   Symptoms:  Intermittent tingling and numbness in various locations on body--primarily on the left side:  Current symptoms located in small area above knee, top of left foot, and 1-2 fingers left hand.    Onset:  4 days ago    No history of similar symptoms.   Denies recent injury.   Denies pain.   Denies weakness.  Denies visual changes.     Fever: denies  Plan: clinic appointment with provider scheduled today  Emergency Room if symptoms worsen prior to appointment.   Caller agrees with this plan/advise.  [employer] [id number] [address] & [facility]    [phone] (home)       Jojo YANES RN,BSN

## 2020-02-27 NOTE — TELEPHONE ENCOUNTER
Reason for call:  Patient reporting a symptom    Symptom or request: Numbness/tingling left side of body    Duration (how long have symptoms been present): 4 days    Have you been treated for this before? No    Additional comments:Transferred to triage    Phone Number patient can be reached at:  Home number on file [phone] (home)    Best Time:  Any    Can we leave a detailed message on this number:  YES    Call taken on 2/27/2020 at 12:27 PM by Ching Marsh

## 2020-02-28 LAB — DEPRECATED CALCIDIOL+CALCIFEROL SERPL-MC: 11 UG/L (ref 20–75)

## 2020-02-28 NOTE — RESULT ENCOUNTER NOTE
Your vitamin D is very low. I recommend taking 5000iu of D3 daily. The B12 is normal. Let me know if you have questions  Carol

## 2020-03-02 ENCOUNTER — TRANSFERRED RECORDS (OUTPATIENT)
Dept: HEALTH INFORMATION MANAGEMENT | Facility: CLINIC | Age: 30
End: 2020-03-02

## 2020-04-03 DIAGNOSIS — R93.0 FAMILIAL ENLARGEMENT OF THE SELLA TURCICA: ICD-10-CM

## 2020-04-03 LAB
CRP SERPL-MCNC: <2.9 MG/L (ref 0–8)
ERYTHROCYTE [SEDIMENTATION RATE] IN BLOOD BY WESTERGREN METHOD: 9 MM/H (ref 0–15)
FOLATE SERPL-MCNC: 14.7 NG/ML
T4 FREE SERPL-MCNC: 1.04 NG/DL (ref 0.76–1.46)
TSH SERPL DL<=0.005 MIU/L-ACNC: 0.8 MU/L (ref 0.4–4)

## 2020-04-03 PROCEDURE — 85732 THROMBOPLASTIN TIME PARTIAL: CPT | Performed by: PSYCHIATRY & NEUROLOGY

## 2020-04-03 PROCEDURE — 86481 TB AG RESPONSE T-CELL SUSP: CPT | Performed by: PSYCHIATRY & NEUROLOGY

## 2020-04-03 PROCEDURE — 82784 ASSAY IGA/IGD/IGG/IGM EACH: CPT | Performed by: PSYCHIATRY & NEUROLOGY

## 2020-04-03 PROCEDURE — 86140 C-REACTIVE PROTEIN: CPT | Performed by: PSYCHIATRY & NEUROLOGY

## 2020-04-03 PROCEDURE — 82164 ANGIOTENSIN I ENZYME TEST: CPT | Mod: 90 | Performed by: INTERNAL MEDICINE

## 2020-04-03 PROCEDURE — 36415 COLL VENOUS BLD VENIPUNCTURE: CPT | Performed by: INTERNAL MEDICINE

## 2020-04-03 PROCEDURE — 84207 ASSAY OF VITAMIN B-6: CPT | Mod: 90 | Performed by: INTERNAL MEDICINE

## 2020-04-03 PROCEDURE — 86038 ANTINUCLEAR ANTIBODIES: CPT | Performed by: PSYCHIATRY & NEUROLOGY

## 2020-04-03 PROCEDURE — 87389 HIV-1 AG W/HIV-1&-2 AB AG IA: CPT | Performed by: PSYCHIATRY & NEUROLOGY

## 2020-04-03 PROCEDURE — 86334 IMMUNOFIX E-PHORESIS SERUM: CPT | Performed by: PSYCHIATRY & NEUROLOGY

## 2020-04-03 PROCEDURE — 86235 NUCLEAR ANTIGEN ANTIBODY: CPT | Performed by: PSYCHIATRY & NEUROLOGY

## 2020-04-03 PROCEDURE — 86780 TREPONEMA PALLIDUM: CPT | Performed by: PSYCHIATRY & NEUROLOGY

## 2020-04-03 PROCEDURE — 82525 ASSAY OF COPPER: CPT | Mod: 90 | Performed by: INTERNAL MEDICINE

## 2020-04-03 PROCEDURE — 86147 CARDIOLIPIN ANTIBODY EA IG: CPT | Performed by: PSYCHIATRY & NEUROLOGY

## 2020-04-03 PROCEDURE — 84439 ASSAY OF FREE THYROXINE: CPT | Performed by: INTERNAL MEDICINE

## 2020-04-03 PROCEDURE — 84443 ASSAY THYROID STIM HORMONE: CPT | Performed by: INTERNAL MEDICINE

## 2020-04-03 PROCEDURE — 84446 ASSAY OF VITAMIN E: CPT | Mod: 90 | Performed by: INTERNAL MEDICINE

## 2020-04-03 PROCEDURE — 99000 SPECIMEN HANDLING OFFICE-LAB: CPT | Performed by: INTERNAL MEDICINE

## 2020-04-03 PROCEDURE — 85597 PHOSPHOLIPID PLTLT NEUTRALIZ: CPT | Performed by: PSYCHIATRY & NEUROLOGY

## 2020-04-03 PROCEDURE — 00000167 ZZHCL STATISTIC INR NC: Performed by: PSYCHIATRY & NEUROLOGY

## 2020-04-03 PROCEDURE — 86431 RHEUMATOID FACTOR QUANT: CPT | Performed by: PSYCHIATRY & NEUROLOGY

## 2020-04-03 PROCEDURE — 00000401 ZZHCL STATISTIC THROMBIN TIME NC: Performed by: PSYCHIATRY & NEUROLOGY

## 2020-04-03 PROCEDURE — 85652 RBC SED RATE AUTOMATED: CPT | Performed by: INTERNAL MEDICINE

## 2020-04-03 PROCEDURE — 85613 RUSSELL VIPER VENOM DILUTED: CPT | Performed by: PSYCHIATRY & NEUROLOGY

## 2020-04-03 PROCEDURE — 85730 THROMBOPLASTIN TIME PARTIAL: CPT | Performed by: PSYCHIATRY & NEUROLOGY

## 2020-04-03 PROCEDURE — 82746 ASSAY OF FOLIC ACID SERUM: CPT | Performed by: PSYCHIATRY & NEUROLOGY

## 2020-04-03 PROCEDURE — 86617 LYME DISEASE ANTIBODY: CPT | Mod: 90 | Performed by: INTERNAL MEDICINE

## 2020-04-04 LAB
ACE SERPL-CCNC: 37 U/L (ref 9–67)
COPPER SERPL-MCNC: 110 UG/DL (ref 70–140)
T PALLIDUM AB SER QL: NONREACTIVE

## 2020-04-05 LAB — B BURGDOR IGG SER QL IB: NEGATIVE

## 2020-04-06 LAB
A-TOCOPHEROL VIT E SERPL-MCNC: 12.5 MG/L (ref 5.5–18)
BETA+GAMMA TOCOPHEROL SERPL-MCNC: 1.4 MG/L (ref 0–6)
CARDIOLIPIN IGA SERPL-ACNC: <0.5 APL U/ML (ref 0–19.9)
ENA SS-A IGG SER IA-ACNC: <0.2 AI (ref 0–0.9)
ENA SS-B IGG SER IA-ACNC: <0.2 AI (ref 0–0.9)
GAMMA INTERFERON BACKGROUND BLD IA-ACNC: 0.02 IU/ML
HIV 1+2 AB+HIV1 P24 AG SERPL QL IA: NONREACTIVE
IGA SERPL-MCNC: 353 MG/DL (ref 84–499)
IGG SERPL-MCNC: 1491 MG/DL (ref 610–1616)
IGM SERPL-MCNC: 67 MG/DL (ref 35–242)
M TB IFN-G BLD-IMP: NEGATIVE
M TB IFN-G CD4+ BCKGRND COR BLD-ACNC: 8.06 IU/ML
MITOGEN IGNF BCKGRD COR BLD-ACNC: 0 IU/ML
MITOGEN IGNF BCKGRD COR BLD-ACNC: 0 IU/ML
PROT PATTERN SERPL IFE-IMP: NORMAL
RHEUMATOID FACT SER NEPH-ACNC: <7 IU/ML (ref 0–20)

## 2020-04-07 LAB
ANA SER QL IF: NEGATIVE
LA PPP-IMP: NEGATIVE

## 2020-04-19 LAB — VIT B6 SERPL-MCNC: 73.8 NMOL/L (ref 20–125)

## 2020-10-10 ENCOUNTER — OFFICE VISIT (OUTPATIENT)
Dept: URGENT CARE | Facility: URGENT CARE | Age: 30
End: 2020-10-10
Payer: COMMERCIAL

## 2020-10-10 VITALS
WEIGHT: 187 LBS | OXYGEN SATURATION: 100 % | SYSTOLIC BLOOD PRESSURE: 132 MMHG | TEMPERATURE: 98.7 F | HEART RATE: 100 BPM | DIASTOLIC BLOOD PRESSURE: 70 MMHG | BODY MASS INDEX: 27.62 KG/M2 | RESPIRATION RATE: 16 BRPM

## 2020-10-10 DIAGNOSIS — N50.811 PAIN IN RIGHT TESTICLE: Primary | ICD-10-CM

## 2020-10-10 LAB
ALBUMIN UR-MCNC: NEGATIVE MG/DL
APPEARANCE UR: CLEAR
BILIRUB UR QL STRIP: NEGATIVE
COLOR UR AUTO: YELLOW
GLUCOSE UR STRIP-MCNC: NEGATIVE MG/DL
HGB UR QL STRIP: NEGATIVE
KETONES UR STRIP-MCNC: NEGATIVE MG/DL
LEUKOCYTE ESTERASE UR QL STRIP: NEGATIVE
NITRATE UR QL: NEGATIVE
PH UR STRIP: 5.5 PH (ref 5–7)
SOURCE: NORMAL
SP GR UR STRIP: 1.01 (ref 1–1.03)
UROBILINOGEN UR STRIP-ACNC: 0.2 EU/DL (ref 0.2–1)

## 2020-10-10 PROCEDURE — 81003 URINALYSIS AUTO W/O SCOPE: CPT | Performed by: FAMILY MEDICINE

## 2020-10-10 PROCEDURE — 99214 OFFICE O/P EST MOD 30 MIN: CPT | Performed by: FAMILY MEDICINE

## 2020-10-10 RX ORDER — LEVOFLOXACIN 500 MG/1
500 TABLET, FILM COATED ORAL DAILY
Qty: 10 TABLET | Refills: 0 | Status: SHIPPED | OUTPATIENT
Start: 2020-10-10 | End: 2022-02-16

## 2020-10-10 NOTE — PROGRESS NOTES
SUBJECTIVE:   Skyler York is a 29 year old male presenting with a chief complaint of   Chief Complaint   Patient presents with     Groin Swelling     back of right testicle has had some pain the last couple days; notes a mild burning sensation with urination; mild pain when doing movements like driving (manual car); is a little worse today; no urinary sx/odor/discharge; no swelling/lumps that he can tell; took one aleve last night which helped a little bit; did have sexual intercourse last night after taking aleve, no pain with ejaculation     Urgent Care     No discharge from his penis.      1 sexual partner.  He has been worried about sexually transmitted disease  He is an established patient of [facility].        Review of Systems   Genitourinary: Positive for testicular pain.   All other systems reviewed and are negative.      Past Medical History:   Diagnosis Date     Allergic rhinitis      Childhood asthma      Family History   Problem Relation Age of Onset     Hypertension Mother      Colon Cancer Father         60's     Diabetes Paternal Uncle      Hypertension Paternal Uncle      Breast Cancer Paternal Aunt      No Known Problems Maternal Grandmother      No Known Problems Maternal Grandfather      No Known Problems Paternal Grandmother      No Known Problems Paternal Grandfather      Coronary Artery Disease No family hx of      Cerebrovascular Disease No family hx of      Prostate Cancer No family hx of      Other Cancer No family hx of      Current Outpatient Medications   Medication Sig Dispense Refill     levofloxacin (LEVAQUIN) 500 MG tablet Take 1 tablet (500 mg) by mouth daily 10 tablet 0     Social History     Tobacco Use     Smoking status: Never Smoker     Smokeless tobacco: Never Used   Substance Use Topics     Alcohol use: No       OBJECTIVE  /70 (BP Location: Right arm, Patient Position: Chair, Cuff Size: Adult Regular)   Pulse 100   Temp 98.7  F (37.1  C) (Tympanic)   Resp 16   Wt  84.8 kg (187 lb)   SpO2 100%   BMI 27.62 kg/m      Physical Exam  Vitals signs and nursing note reviewed.   HENT:      Head: Normocephalic.   Eyes:      Extraocular Movements: Extraocular movements intact.      Pupils: Pupils are equal, round, and reactive to light.   Neck:      Musculoskeletal: Normal range of motion.   Cardiovascular:      Rate and Rhythm: Normal rate.      Pulses: Normal pulses.   Pulmonary:      Effort: Pulmonary effort is normal.   Genitourinary:     Penis: Normal.       Comments: Normal male testicles down.  Right side posterior inferior aspect of the testicle slightly tender to palpation.  Musculoskeletal: Normal range of motion.   Skin:     General: Skin is warm.   Neurological:      General: No focal deficit present.      Mental Status: He is alert.   Psychiatric:         Mood and Affect: Mood normal.         Labs:  Negative.    X-Ray was not done.    ASSESSMENT:      ICD-10-CM    1. Pain in right testicle  N50.811 levofloxacin (LEVAQUIN) 500 MG tablet     *UA reflex to Microscopic and Culture ([facility] (except [place] and [place])      Examination of his testicle revealed no abnormality of the orientation.  He did have some tenderness on the testicle itself and not on the epididymis.    I do feel that he needs to be followed up for reevaluation.  He had no symptomatology that might suggest a prostatitis.  Prostate exam was not conducted today.        Medical Decision Making:    Differential Diagnosis:  Testicular pain, epididymitis, orchitis, prostatitis    Serious Comorbid Conditions:  Adult:  None    PLAN:  1.  Levaquin for 10 days  2.  UA      Followup:    If not improving or if condition worsens, follow up with your Primary Care Provider    There are no Patient Instructions on file for this visit.

## 2020-10-14 ENCOUNTER — OFFICE VISIT (OUTPATIENT)
Dept: FAMILY MEDICINE | Facility: CLINIC | Age: 30
End: 2020-10-14
Payer: COMMERCIAL

## 2020-10-14 VITALS
TEMPERATURE: 98.2 F | WEIGHT: 187 LBS | OXYGEN SATURATION: 100 % | DIASTOLIC BLOOD PRESSURE: 80 MMHG | HEART RATE: 74 BPM | SYSTOLIC BLOOD PRESSURE: 122 MMHG | BODY MASS INDEX: 27.62 KG/M2

## 2020-10-14 DIAGNOSIS — N50.811 PAIN IN RIGHT TESTICLE: Primary | ICD-10-CM

## 2020-10-14 PROCEDURE — 99214 OFFICE O/P EST MOD 30 MIN: CPT | Performed by: FAMILY MEDICINE

## 2020-10-14 PROCEDURE — 87591 N.GONORRHOEAE DNA AMP PROB: CPT | Performed by: FAMILY MEDICINE

## 2020-10-14 PROCEDURE — 87491 CHLMYD TRACH DNA AMP PROBE: CPT | Performed by: FAMILY MEDICINE

## 2020-10-14 NOTE — ASSESSMENT & PLAN NOTE
Area of discomfort is migrating.  Discussed differential.  Much reassurance.  Complete antibiotics.  Broaden database.

## 2020-10-14 NOTE — PROGRESS NOTES
Subjective     Skyler York is a 29 year old male who presents to clinic today for the following health issues:    HPI         Concern - testicle pain   Onset: over a week   Description: pt states he is having right sided testicle pain   Intensity: mild  Progression of Symptoms:  worsening  Accompanying Signs & Symptoms: pt is also having burning with urination   Previous history of similar problem: none   Precipitating factors:        Worsened by: movement and sitting   Alleviating factors:        Improved by: none   Therapies tried and outcome: Levaquin and Aleeve with no relief   Right testicular pain after long periods of sitting.  Waxes wanes.  Occasionally uncomfortable at night.  Sometimes disappears completely.  Seen in urgent care treated with Levaquin.  Patient reports no change.  Naproxen beneficial  Past Medical History:   Diagnosis Date     Allergic rhinitis      Childhood asthma        Past Surgical History:   Procedure Laterality Date     NO HISTORY OF SURGERY         Family History   Problem Relation Age of Onset     Hypertension Mother      Colon Cancer Father         60's     Diabetes Paternal Uncle      Hypertension Paternal Uncle      Breast Cancer Paternal Aunt      No Known Problems Maternal Grandmother      No Known Problems Maternal Grandfather      No Known Problems Paternal Grandmother      No Known Problems Paternal Grandfather      Coronary Artery Disease No family hx of      Cerebrovascular Disease No family hx of      Prostate Cancer No family hx of      Other Cancer No family hx of        Social History     Tobacco Use     Smoking status: Never Smoker     Smokeless tobacco: Never Used   Substance Use Topics     Alcohol use: No         Review of Systems   No rash no discharge no fever no trauma.  Slight abdominal distress after Aleve epigastric        Objective    /80 (BP Location: Right arm, Patient Position: Chair, Cuff Size: Adult Regular)   Pulse 74   Temp 98.2  F (36.8  C)  (Oral)   Wt 84.8 kg (187 lb)   SpO2 100%   BMI 27.62 kg/m    Body mass index is 27.62 kg/m .  Physical Exam     Discomfort is now at the external inguinal ring to palpation of the epididymis.  Testicles nontender unremarkable no mass.  No palpable hernia        Problem List Items Addressed This Visit        Nervous and Auditory    Pain in right testicle - Primary     Area of discomfort is migrating.  Discussed differential.  Much reassurance.  Complete antibiotics.  Broaden database.          Relevant Orders    US Testicular & Scrotum w Doppler Ltd    NEISSERIA GONORRHOEA PCR (Completed)    CHLAMYDIA TRACHOMATIS PCR (Completed)            Jose Barahona MD

## 2020-10-15 LAB
C TRACH DNA SPEC QL NAA+PROBE: NEGATIVE
N GONORRHOEA DNA SPEC QL NAA+PROBE: NEGATIVE
SPECIMEN SOURCE: NORMAL
SPECIMEN SOURCE: NORMAL

## 2020-10-23 ENCOUNTER — HOSPITAL ENCOUNTER (OUTPATIENT)
Dept: ULTRASOUND IMAGING | Facility: CLINIC | Age: 30
Discharge: HOME | End: 2020-10-23
Attending: FAMILY MEDICINE | Admitting: FAMILY MEDICINE
Payer: COMMERCIAL

## 2020-10-23 DIAGNOSIS — N50.811 PAIN IN RIGHT TESTICLE: ICD-10-CM

## 2020-10-23 PROCEDURE — 76870 US EXAM SCROTUM: CPT

## 2021-01-03 ENCOUNTER — HEALTH MAINTENANCE LETTER (OUTPATIENT)
Age: 31
End: 2021-01-03

## 2021-04-25 ENCOUNTER — APPOINTMENT (OUTPATIENT)
Dept: GENERAL RADIOLOGY | Facility: CLINIC | Age: 31
End: 2021-04-25
Attending: EMERGENCY MEDICINE
Payer: COMMERCIAL

## 2021-04-25 ENCOUNTER — HOSPITAL ENCOUNTER (EMERGENCY)
Facility: CLINIC | Age: 31
Discharge: HOME | End: 2021-04-25
Attending: EMERGENCY MEDICINE | Admitting: EMERGENCY MEDICINE
Payer: COMMERCIAL

## 2021-04-25 VITALS
OXYGEN SATURATION: 100 % | TEMPERATURE: 97.9 F | RESPIRATION RATE: 19 BRPM | HEART RATE: 77 BPM | DIASTOLIC BLOOD PRESSURE: 69 MMHG | SYSTOLIC BLOOD PRESSURE: 127 MMHG

## 2021-04-25 DIAGNOSIS — R00.2 PALPITATIONS: ICD-10-CM

## 2021-04-25 DIAGNOSIS — R07.9 CHEST PAIN, UNSPECIFIED TYPE: ICD-10-CM

## 2021-04-25 LAB
ANION GAP SERPL CALCULATED.3IONS-SCNC: 2 MMOL/L (ref 3–14)
BASOPHILS # BLD AUTO: 0 10E9/L (ref 0–0.2)
BASOPHILS NFR BLD AUTO: 0.6 %
BUN SERPL-MCNC: 14 MG/DL (ref 7–30)
CALCIUM SERPL-MCNC: 9 MG/DL (ref 8.5–10.1)
CHLORIDE SERPL-SCNC: 106 MMOL/L (ref 94–109)
CO2 SERPL-SCNC: 30 MMOL/L (ref 20–32)
CREAT SERPL-MCNC: 1.13 MG/DL (ref 0.66–1.25)
DIFFERENTIAL METHOD BLD: NORMAL
EOSINOPHIL # BLD AUTO: 0.1 10E9/L (ref 0–0.7)
EOSINOPHIL NFR BLD AUTO: 2.1 %
ERYTHROCYTE [DISTWIDTH] IN BLOOD BY AUTOMATED COUNT: 11.9 % (ref 10–15)
GFR SERPL CREATININE-BSD FRML MDRD: 86 ML/MIN/{1.73_M2}
GLUCOSE SERPL-MCNC: 103 MG/DL (ref 70–99)
HCT VFR BLD AUTO: 43 % (ref 40–53)
HGB BLD-MCNC: 14.6 G/DL (ref 13.3–17.7)
IMM GRANULOCYTES # BLD: 0 10E9/L (ref 0–0.4)
IMM GRANULOCYTES NFR BLD: 0.2 %
LYMPHOCYTES # BLD AUTO: 2.2 10E9/L (ref 0.8–5.3)
LYMPHOCYTES NFR BLD AUTO: 41.3 %
MAGNESIUM SERPL-MCNC: 2 MG/DL (ref 1.6–2.3)
MCH RBC QN AUTO: 29.7 PG (ref 26.5–33)
MCHC RBC AUTO-ENTMCNC: 34 G/DL (ref 31.5–36.5)
MCV RBC AUTO: 87 FL (ref 78–100)
MONOCYTES # BLD AUTO: 0.5 10E9/L (ref 0–1.3)
MONOCYTES NFR BLD AUTO: 8.6 %
NEUTROPHILS # BLD AUTO: 2.5 10E9/L (ref 1.6–8.3)
NEUTROPHILS NFR BLD AUTO: 47.2 %
NRBC # BLD AUTO: 0 10*3/UL
NRBC BLD AUTO-RTO: 0 /100
PLATELET # BLD AUTO: 243 10E9/L (ref 150–450)
POTASSIUM SERPL-SCNC: 3.8 MMOL/L (ref 3.4–5.3)
RBC # BLD AUTO: 4.92 10E12/L (ref 4.4–5.9)
SODIUM SERPL-SCNC: 138 MMOL/L (ref 133–144)
TROPONIN I SERPL-MCNC: <0.015 UG/L (ref 0–0.04)
WBC # BLD AUTO: 5.2 10E9/L (ref 4–11)

## 2021-04-25 PROCEDURE — 80048 BASIC METABOLIC PNL TOTAL CA: CPT | Performed by: EMERGENCY MEDICINE

## 2021-04-25 PROCEDURE — 83735 ASSAY OF MAGNESIUM: CPT | Performed by: EMERGENCY MEDICINE

## 2021-04-25 PROCEDURE — 85025 COMPLETE CBC W/AUTO DIFF WBC: CPT | Performed by: EMERGENCY MEDICINE

## 2021-04-25 PROCEDURE — 71046 X-RAY EXAM CHEST 2 VIEWS: CPT

## 2021-04-25 PROCEDURE — 84484 ASSAY OF TROPONIN QUANT: CPT | Performed by: EMERGENCY MEDICINE

## 2021-04-25 PROCEDURE — 93005 ELECTROCARDIOGRAM TRACING: CPT

## 2021-04-25 PROCEDURE — 99285 EMERGENCY DEPT VISIT HI MDM: CPT | Mod: 25

## 2021-04-25 ASSESSMENT — ENCOUNTER SYMPTOMS
SHORTNESS OF BREATH: 0
PALPITATIONS: 1
NAUSEA: 0

## 2021-04-25 NOTE — ED PROVIDER NOTES
History   Chief Complaint:  Palpitations      HPI   Skyler Yrok is a 30 year old male who presents with intermittent palpitations. The patient says that he has been having intermittent palpitations for the past week. He says that it feels like his heart is fluttering or a chest muscle is twitching. He says that there is no pattern to the sensation, but it can last for up to a few minutes at a time. He endorses the use of Tums to no relief. The patient says that today around noon he had an episode of lightheadedness that happened while he was sitting and eating. He says that it only last a few seconds. The patient's wife says that there was no change in his appearance either. The patient denies any shortness of breath, nausea, rashes, or surgeries. The patient notes that his grandfather was in his 40's when he passed away due to heart issues or possible stroke.       Review of Systems   Respiratory: Negative for shortness of breath.    Cardiovascular: Positive for chest pain and palpitations.   Gastrointestinal: Negative for nausea.   Skin: Negative for rash.   All other systems reviewed and are negative.    Allergies:  No Known Drug Allergies     Medications:  Levaquin     Past Medical History:    Allergic rhinitis    Childhood asthma      Past Surgical History:    Surgical history reviewed. No pertinent surgical history.      Family History:    Hypertension   Colon cancer  Diabetes  Breast cancer     Social History:  Patient presents with his wife.    Physical Exam     Patient Vitals for the past 24 hrs:   BP Temp Temp src Pulse Resp SpO2   04/25/21 1715 127/69 -- -- 77 19 100 %   04/25/21 1700 (!) 135/97 -- -- 74 12 100 %   04/25/21 1645 120/80 -- -- 69 19 100 %   04/25/21 1600 131/80 -- -- 72 19 100 %   04/25/21 1530 135/85 97.9  F (36.6  C) Temporal 75 16 100 %       Physical Exam  Constitutional: Vital signs reviewed as above.   Head: No external signs of trauma noted.  Eyes: Pupils are equal, round, and  reactive to light.   Neck: No JVD noted  Cardiovascular: Normal rate, regular rhythm and normal heart sounds.  No murmur heard. Equal B/L peripheral pulses.  Pulmonary/Chest: Effort normal and breath sounds normal. No respiratory distress. Patient has no wheezes. Patient has no rales.   Gastrointestinal: Soft. There is no tenderness.   Musculoskeletal/Extremities: No edema noted. Normal tone.  Neurological: Patient is alert and oriented to person, place, and time.   Skin: Skin is warm and dry. There is no diaphoresis noted.   Psychiatric: The patient appears calm.      Emergency Department Course     ECG  ECG taken at 1536, ECG read at 1540  Normal sinus rhythm   Normal ECG   Rate 90 bpm. KS interval 140 ms. QRS duration 82 ms. QT/QTc 368/450 ms. P-R-T axes 57 62 28.          Imaging:  Chest XR,  PA & LAT  Negative chest.  CLAUDE THOMSON MD  Reading per radiology     Laboratory:    CBC: WBC: 5.2, HGB: 14.6, PLT: 243  BMP: Glucose 103 (H), anion gap 2 (L), o/w WNL (Creatinine: 1.13)  Troponin (Collected 1558): <0.015  Magnesium: 2.0     Procedures    Emergency Department Course:    Reviewed:  I reviewed nursing notes, vitals and past medical history       ED Course as of Apr 25 2113   Sun Apr 25, 2021   1544 Patient exam performed.       1709 Patient rechecked and updated.            Disposition:  The patient was discharged to home.       Impression & Plan         CMS Diagnoses:           Medical Decision Making:  Skyler York is a 30 year old male presented to the Emergency Department with a complaint of chest pain. Fortunately the workup in the ED has been unremarkable and at this time I am not concerned for ACS. The EKG shows NSR. The troponin is negative, and the patient's HEART Score is 0. Given these findings, he is low risk for a major cardiac event at 6 weeks.     I considered other possible causes of chest pain including PE (PERC negative), infection, pneumothorax, aortic dissection, and even more benign  causes such as reflux and esophageal motility issues. The physical exam, laboratory, and radiological findings listed above make life threatening conditions less likely. At this time I believe the patient is stable for discharge. I have encouraged close Primary Care Physician follow up. I have also placed an outpatient order for a heart monitor. Anticipatory guidance given to patient and wife prior to discharge.    Diagnosis:    ICD-10-CM    1. Chest pain, unspecified type  R07.9 CBC with platelets differential     Basic metabolic panel     Troponin I     Magnesium     Cardiac Event Monitor Adult Pediatric     CANCELED: Cardiac Event Monitor Adult Pediatric   2. Palpitations  R00.2 Cardiac Event Monitor Adult Pediatric     CANCELED: Cardiac Event Monitor Adult Pediatric       Discharge Medications:  Discharge Medication List as of 4/25/2021  5:29 PM          Scribe Disclosure:  I, Barry Meléndez, am serving as a scribe at 3:36 PM on 4/25/2021 to document services personally performed by Ishmael Thurman DO based on my observations and the provider's statements to me.          Ishmael Thurman DO  04/25/21 2344

## 2021-04-25 NOTE — ED TRIAGE NOTES
Pt presents to ED with intermittent chest pain for the last week. States this morning her felt slightly lightheaded. Also reports intermittent palpitations. Pt denies other symptoms. States he has been able to exercise normally this week. ABC intact. A/O x4.

## 2021-04-25 NOTE — DISCHARGE INSTRUCTIONS
What do you do next:   Continue your home medications unless we have specifically changed them  I have placed an order for a wearable heart monitor.  You should contact the clinic that you will be establishing care with in the [facility] system and have them follow-up on the results from this heart monitor.  Follow up as indicated below    When do you return: If you have severe chest pain, persistent lightheadedness or fainting, or any other symptoms that concern you, please return to the ED for reevaluation.    Thank you for allowing us to care for you today.

## 2021-04-26 LAB — INTERPRETATION ECG - MUSE: NORMAL

## 2021-04-30 ENCOUNTER — HOSPITAL ENCOUNTER (OUTPATIENT)
Dept: CARDIOLOGY | Facility: CLINIC | Age: 31
Discharge: HOME | End: 2021-04-30
Attending: EMERGENCY MEDICINE | Admitting: EMERGENCY MEDICINE
Payer: COMMERCIAL

## 2021-04-30 DIAGNOSIS — R00.2 PALPITATIONS: ICD-10-CM

## 2021-04-30 DIAGNOSIS — R07.9 CHEST PAIN, UNSPECIFIED TYPE: ICD-10-CM

## 2021-04-30 PROCEDURE — 93270 REMOTE 30 DAY ECG REV/REPORT: CPT

## 2021-04-30 PROCEDURE — 93272 ECG/REVIEW INTERPRET ONLY: CPT | Performed by: INTERNAL MEDICINE

## 2021-04-30 NOTE — PROGRESS NOTES
Event monitor placed for 30 days. Patient is working on establishing primary care here in [place] to send the results to.

## 2021-06-06 NOTE — ED NOTES
Called patient and left message regarding his heart monitor results. Encouraged PCP follow up. Informed patient he could call the ED today as I work until 11PM if he has questions.     Ishmael Thurman,   06/06/21 1506

## 2021-10-10 ENCOUNTER — HEALTH MAINTENANCE LETTER (OUTPATIENT)
Age: 31
End: 2021-10-10

## 2022-01-29 ENCOUNTER — HEALTH MAINTENANCE LETTER (OUTPATIENT)
Age: 32
End: 2022-01-29

## 2022-02-09 SDOH — ECONOMIC STABILITY: INCOME INSECURITY: IN THE LAST 12 MONTHS, WAS THERE A TIME WHEN YOU WERE NOT ABLE TO PAY THE MORTGAGE OR RENT ON TIME?: NO

## 2022-02-09 SDOH — ECONOMIC STABILITY: FOOD INSECURITY: WITHIN THE PAST 12 MONTHS, THE FOOD YOU BOUGHT JUST DIDN'T LAST AND YOU DIDN'T HAVE MONEY TO GET MORE.: NEVER TRUE

## 2022-02-09 SDOH — ECONOMIC STABILITY: TRANSPORTATION INSECURITY
IN THE PAST 12 MONTHS, HAS THE LACK OF TRANSPORTATION KEPT YOU FROM MEDICAL APPOINTMENTS OR FROM GETTING MEDICATIONS?: NO

## 2022-02-09 SDOH — ECONOMIC STABILITY: TRANSPORTATION INSECURITY
IN THE PAST 12 MONTHS, HAS LACK OF TRANSPORTATION KEPT YOU FROM MEETINGS, WORK, OR FROM GETTING THINGS NEEDED FOR DAILY LIVING?: NO

## 2022-02-09 SDOH — HEALTH STABILITY: PHYSICAL HEALTH: ON AVERAGE, HOW MANY DAYS PER WEEK DO YOU ENGAGE IN MODERATE TO STRENUOUS EXERCISE (LIKE A BRISK WALK)?: 5 DAYS

## 2022-02-09 SDOH — ECONOMIC STABILITY: FOOD INSECURITY: WITHIN THE PAST 12 MONTHS, YOU WORRIED THAT YOUR FOOD WOULD RUN OUT BEFORE YOU GOT MONEY TO BUY MORE.: NEVER TRUE

## 2022-02-09 SDOH — HEALTH STABILITY: PHYSICAL HEALTH: ON AVERAGE, HOW MANY MINUTES DO YOU ENGAGE IN EXERCISE AT THIS LEVEL?: 60 MIN

## 2022-02-09 SDOH — ECONOMIC STABILITY: INCOME INSECURITY: HOW HARD IS IT FOR YOU TO PAY FOR THE VERY BASICS LIKE FOOD, HOUSING, MEDICAL CARE, AND HEATING?: NOT HARD AT ALL

## 2022-02-09 ASSESSMENT — ENCOUNTER SYMPTOMS
HEARTBURN: 0
FREQUENCY: 0
ABDOMINAL PAIN: 0
COUGH: 0
PARESTHESIAS: 0
HEADACHES: 0
SHORTNESS OF BREATH: 0
NERVOUS/ANXIOUS: 0
HEMATOCHEZIA: 0
EYE PAIN: 0
NAUSEA: 0
DIARRHEA: 0
JOINT SWELLING: 0
SORE THROAT: 0
FEVER: 0
MYALGIAS: 0
DYSURIA: 0
CONSTIPATION: 0
PALPITATIONS: 0
WEAKNESS: 0
HEMATURIA: 0
ARTHRALGIAS: 0
CHILLS: 0
DIZZINESS: 0

## 2022-02-09 ASSESSMENT — LIFESTYLE VARIABLES
HOW MANY STANDARD DRINKS CONTAINING ALCOHOL DO YOU HAVE ON A TYPICAL DAY: PATIENT DECLINED
HOW OFTEN DO YOU HAVE A DRINK CONTAINING ALCOHOL: NEVER
HOW OFTEN DO YOU HAVE SIX OR MORE DRINKS ON ONE OCCASION: NEVER

## 2022-02-09 ASSESSMENT — SOCIAL DETERMINANTS OF HEALTH (SDOH)
IN A TYPICAL WEEK, HOW MANY TIMES DO YOU TALK ON THE PHONE WITH FAMILY, FRIENDS, OR NEIGHBORS?: THREE TIMES A WEEK
HOW OFTEN DO YOU GET TOGETHER WITH FRIENDS OR RELATIVES?: ONCE A WEEK
HOW OFTEN DO YOU ATTEND CHURCH OR RELIGIOUS SERVICES?: NEVER
DO YOU BELONG TO ANY CLUBS OR ORGANIZATIONS SUCH AS CHURCH GROUPS UNIONS, FRATERNAL OR ATHLETIC GROUPS, OR SCHOOL GROUPS?: NO

## 2022-02-16 ENCOUNTER — OFFICE VISIT (OUTPATIENT)
Dept: FAMILY MEDICINE | Facility: CLINIC | Age: 32
End: 2022-02-16
Payer: COMMERCIAL

## 2022-02-16 VITALS
OXYGEN SATURATION: 100 % | SYSTOLIC BLOOD PRESSURE: 132 MMHG | TEMPERATURE: 98.3 F | HEART RATE: 83 BPM | WEIGHT: 194 LBS | HEIGHT: 70 IN | DIASTOLIC BLOOD PRESSURE: 72 MMHG | BODY MASS INDEX: 27.77 KG/M2

## 2022-02-16 DIAGNOSIS — F41.0 PANIC ATTACK: ICD-10-CM

## 2022-02-16 DIAGNOSIS — Z00.00 ROUTINE GENERAL MEDICAL EXAMINATION AT A HEALTH CARE FACILITY: ICD-10-CM

## 2022-02-16 PROBLEM — N50.811 PAIN IN RIGHT TESTICLE: Status: RESOLVED | Noted: 2020-10-14 | Resolved: 2022-02-16

## 2022-02-16 PROCEDURE — 99395 PREV VISIT EST AGE 18-39: CPT | Performed by: FAMILY MEDICINE

## 2022-02-16 PROCEDURE — 80053 COMPREHEN METABOLIC PANEL: CPT | Performed by: FAMILY MEDICINE

## 2022-02-16 PROCEDURE — 36415 COLL VENOUS BLD VENIPUNCTURE: CPT | Performed by: FAMILY MEDICINE

## 2022-02-16 PROCEDURE — 84443 ASSAY THYROID STIM HORMONE: CPT | Performed by: FAMILY MEDICINE

## 2022-02-16 PROCEDURE — 80061 LIPID PANEL: CPT | Performed by: FAMILY MEDICINE

## 2022-02-16 PROCEDURE — 99213 OFFICE O/P EST LOW 20 MIN: CPT | Mod: 25 | Performed by: FAMILY MEDICINE

## 2022-02-16 PROCEDURE — 86803 HEPATITIS C AB TEST: CPT | Performed by: FAMILY MEDICINE

## 2022-02-16 ASSESSMENT — ENCOUNTER SYMPTOMS
DYSURIA: 0
PARESTHESIAS: 0
COUGH: 0
DIZZINESS: 0
FEVER: 0
SORE THROAT: 0
JOINT SWELLING: 0
HEMATURIA: 0
NAUSEA: 0
DIARRHEA: 0
ARTHRALGIAS: 0
HEARTBURN: 0
CHILLS: 0
NERVOUS/ANXIOUS: 0
CONSTIPATION: 0
HEMATOCHEZIA: 0
EYE PAIN: 0
MYALGIAS: 0
PALPITATIONS: 0
SHORTNESS OF BREATH: 0
ABDOMINAL PAIN: 0
FREQUENCY: 0
WEAKNESS: 0
HEADACHES: 0

## 2022-02-16 NOTE — PROGRESS NOTES
SUBJECTIVE:   CC: Skyler York is an 31 year old male who presents for preventative health visit.     {    Healthy Habits:     Getting at least 3 servings of Calcium per day:  Yes    Bi-annual eye exam:  Yes    Dental care twice a year:  Yes    Sleep apnea or symptoms of sleep apnea:  None    Diet:  Regular (no restrictions)    Frequency of exercise:  4-5 days/week    Duration of exercise:  Greater than 60 minutes    Taking medications regularly:  Yes    Medication side effects:  Not applicable    PHQ-2 Total Score: 0    Additional concerns today:  Yes              Today's PHQ-2 Score:   PHQ-2 ( 1999 Pfizer) 2/9/2022   Q1: Little interest or pleasure in doing things 0   Q2: Feeling down, depressed or hopeless 0   PHQ-2 Score 0   PHQ-2 Total Score (12-17 Years)- Positive if 3 or more points; Administer PHQ-A if positive -   Q1: Little interest or pleasure in doing things Not at all   Q2: Feeling down, depressed or hopeless Not at all   PHQ-2 Score 0       Abuse: Current or Past(Physical, Sexual or Emotional)- No  Do you feel safe in your environment? Yes    Have you ever done Advance Care Planning? (For example, a Health Directive, POLST, or a discussion with a medical provider or your loved ones about your wishes): No, advance care planning information given to patient to review.  Patient declined advance care planning discussion at this time.    Social History     Tobacco Use     Smoking status: Never Smoker     Smokeless tobacco: Never Used   Substance Use Topics     Alcohol use: No         Alcohol Use 2/9/2022   Prescreen: >3 drinks/day or >7 drinks/week? No       Last PSA: No results found for: PSA    Reviewed orders with patient. Reviewed health maintenance and updated orders accordingly - Yes      Reviewed and updated as needed this visit by clinical staff   Tobacco  Allergies    Med Hx  Surg Hx  Fam Hx  Soc Hx        Reviewed and updated as needed this visit by Provider                 Past Medical  "History:   Diagnosis Date     Allergic rhinitis      Childhood asthma      Pain in right testicle 10/14/2020     Panic attack 2/16/2022       Past Surgical History:   Procedure Laterality Date     NO HISTORY OF SURGERY         Family History   Problem Relation Age of Onset     Hypertension Mother      Colon Cancer Father         60's     Diabetes Paternal Uncle      Hypertension Paternal Uncle      Breast Cancer Paternal Aunt      No Known Problems Maternal Grandmother      No Known Problems Maternal Grandfather      No Known Problems Paternal Grandmother      No Known Problems Paternal Grandfather      Coronary Artery Disease No family hx of      Cerebrovascular Disease No family hx of      Prostate Cancer No family hx of      Other Cancer No family hx of        Social History     Tobacco Use     Smoking status: Never Smoker     Smokeless tobacco: Never Used   Substance Use Topics     Alcohol use: No         Review of Systems   Constitutional: Negative for chills and fever.   HENT: Negative for congestion, ear pain, hearing loss and sore throat.    Eyes: Negative for pain and visual disturbance.   Respiratory: Negative for cough and shortness of breath.    Cardiovascular: Negative for chest pain, palpitations and peripheral edema.   Gastrointestinal: Negative for abdominal pain, constipation, diarrhea, heartburn, hematochezia and nausea.   Genitourinary: Negative for dysuria, frequency, genital sores, hematuria, impotence, penile discharge and urgency.   Musculoskeletal: Negative for arthralgias, joint swelling and myalgias.   Skin: Negative for rash.   Neurological: Negative for dizziness, weakness, headaches and paresthesias.   Psychiatric/Behavioral: Negative for mood changes. The patient is not nervous/anxious.          OBJECTIVE:   /72 (BP Location: Right arm, Patient Position: Chair, Cuff Size: Adult Regular)   Pulse 83   Temp 98.3  F (36.8  C) (Oral)   Ht 1.778 m (5' 10\")   Wt 88 kg (194 lb)   " "SpO2 100%   BMI 27.84 kg/m      Physical Exam  Constitutional:       Appearance: Normal appearance.   HENT:      Head: Atraumatic.      Right Ear: Tympanic membrane normal.      Left Ear: Tympanic membrane normal.      Nose: Nose normal.      Mouth/Throat:      Mouth: Mucous membranes are moist.   Eyes:      Pupils: Pupils are equal, round, and reactive to light.   Cardiovascular:      Rate and Rhythm: Normal rate and regular rhythm.      Heart sounds: Normal heart sounds.   Pulmonary:      Effort: Pulmonary effort is normal.      Breath sounds: Normal breath sounds.   Abdominal:      General: Abdomen is flat. Bowel sounds are normal.   Musculoskeletal:      Cervical back: Neck supple.   Skin:     General: Skin is warm and dry.   Neurological:      General: No focal deficit present.      Mental Status: He is alert.   Psychiatric:         Mood and Affect: Mood normal.               ASSESSMENT/PLAN:     Problem List Items Addressed This Visit     Panic attack     only when flying. Discussed options. He is interested in CBT         Relevant Orders    Adult Mental Health  Referral      Other Visit Diagnoses     Routine general medical examination at a health care facility        Relevant Orders    Hepatitis C Screen Reflex to HCV RNA Quant and Genotype    Comprehensive metabolic panel (BMP + Alb, Alk Phos, ALT, AST, Total. Bili, TP)    Lipid panel reflex to direct LDL Non-fasting    TSH with free T4 reflex          Patient has been advised of split billing requirements and indicates understanding: Yes    COUNSELING:   Reviewed preventive health counseling, as reflected in patient instructions    Estimated body mass index is 27.84 kg/m  as calculated from the following:    Height as of this encounter: 1.778 m (5' 10\").    Weight as of this encounter: 88 kg (194 lb).     Weight management plan: Discussed. Rehular exercise    He reports that he has never smoked. He has never used smokeless " tobacco.      Counseling Resources:  ATP IV Guidelines  Pooled Cohorts Equation Calculator  FRAX Risk Assessment  ICSI Preventive Guidelines  Dietary Guidelines for Americans, 2010  USDA's MyPlate  ASA Prophylaxis  Lung CA Screening    Jose Barahona MD  [facility]

## 2022-02-17 LAB
ALBUMIN SERPL-MCNC: 3.8 G/DL (ref 3.4–5)
ALP SERPL-CCNC: 75 U/L (ref 40–150)
ALT SERPL W P-5'-P-CCNC: 30 U/L (ref 0–70)
ANION GAP SERPL CALCULATED.3IONS-SCNC: 8 MMOL/L (ref 3–14)
AST SERPL W P-5'-P-CCNC: 21 U/L (ref 0–45)
BILIRUB SERPL-MCNC: 0.4 MG/DL (ref 0.2–1.3)
BUN SERPL-MCNC: 17 MG/DL (ref 7–30)
CALCIUM SERPL-MCNC: 8.9 MG/DL (ref 8.5–10.1)
CHLORIDE BLD-SCNC: 105 MMOL/L (ref 94–109)
CHOLEST SERPL-MCNC: 197 MG/DL
CO2 SERPL-SCNC: 25 MMOL/L (ref 20–32)
CREAT SERPL-MCNC: 1.05 MG/DL (ref 0.66–1.25)
FASTING STATUS PATIENT QL REPORTED: ABNORMAL
GFR SERPL CREATININE-BSD FRML MDRD: >90 ML/MIN/1.73M2
GLUCOSE BLD-MCNC: 86 MG/DL (ref 70–99)
HCV AB SERPL QL IA: NONREACTIVE
HDLC SERPL-MCNC: 47 MG/DL
LDLC SERPL CALC-MCNC: 123 MG/DL
NONHDLC SERPL-MCNC: 150 MG/DL
POTASSIUM BLD-SCNC: 4.1 MMOL/L (ref 3.4–5.3)
PROT SERPL-MCNC: 7.5 G/DL (ref 6.8–8.8)
SODIUM SERPL-SCNC: 138 MMOL/L (ref 133–144)
TRIGL SERPL-MCNC: 137 MG/DL
TSH SERPL DL<=0.005 MIU/L-ACNC: 0.78 MU/L (ref 0.4–4)

## 2022-09-18 ENCOUNTER — HEALTH MAINTENANCE LETTER (OUTPATIENT)
Age: 32
End: 2022-09-18

## 2023-01-09 ENCOUNTER — TELEPHONE (OUTPATIENT)
Dept: FAMILY MEDICINE | Facility: CLINIC | Age: 33
End: 2023-01-09

## 2023-01-09 NOTE — TELEPHONE ENCOUNTER
Pt calls.    He tested positive for covid this morning at home.  His wife had it the week prior.  Notified infection prevention.      He has had a fever 102-103.  He is taking tylenol.  He is just resting in bed.  He has a headache, body aches, chills, fever, cough.      He does not want treatment at this time.      Advised to drink lots of fluids, get plenty of rest, treat the symptoms.  Be seen if chest pain and SOB.  If changes mind about possible treatment, would need to be treated within 5 days.  Advised he could rotate tylenol and ibuprofen, but don't take above recommended amounts.  He could put on a cool cloth on head for his fever.  Advised he should be peeing at least every 8 hours.  Advised to mask if out in common spaces with his 15 month old.  Went over quarantine guidelines.

## 2023-04-05 SDOH — ECONOMIC STABILITY: FOOD INSECURITY: WITHIN THE PAST 12 MONTHS, THE FOOD YOU BOUGHT JUST DIDN'T LAST AND YOU DIDN'T HAVE MONEY TO GET MORE.: NEVER TRUE

## 2023-04-05 SDOH — HEALTH STABILITY: PHYSICAL HEALTH: ON AVERAGE, HOW MANY MINUTES DO YOU ENGAGE IN EXERCISE AT THIS LEVEL?: 50 MIN

## 2023-04-05 SDOH — ECONOMIC STABILITY: INCOME INSECURITY: IN THE LAST 12 MONTHS, WAS THERE A TIME WHEN YOU WERE NOT ABLE TO PAY THE MORTGAGE OR RENT ON TIME?: NO

## 2023-04-05 SDOH — ECONOMIC STABILITY: FOOD INSECURITY: WITHIN THE PAST 12 MONTHS, YOU WORRIED THAT YOUR FOOD WOULD RUN OUT BEFORE YOU GOT MONEY TO BUY MORE.: NEVER TRUE

## 2023-04-05 SDOH — ECONOMIC STABILITY: INCOME INSECURITY: HOW HARD IS IT FOR YOU TO PAY FOR THE VERY BASICS LIKE FOOD, HOUSING, MEDICAL CARE, AND HEATING?: NOT HARD AT ALL

## 2023-04-05 SDOH — HEALTH STABILITY: PHYSICAL HEALTH: ON AVERAGE, HOW MANY DAYS PER WEEK DO YOU ENGAGE IN MODERATE TO STRENUOUS EXERCISE (LIKE A BRISK WALK)?: 5 DAYS

## 2023-04-05 ASSESSMENT — ENCOUNTER SYMPTOMS
ABDOMINAL PAIN: 0
CHILLS: 0
FREQUENCY: 0
COUGH: 0
HEMATURIA: 0
DIZZINESS: 0
FEVER: 0
HEMATOCHEZIA: 0
MYALGIAS: 0
JOINT SWELLING: 0
DYSURIA: 0
DIARRHEA: 0
HEADACHES: 0
EYE PAIN: 0
PARESTHESIAS: 0
HEARTBURN: 0
NERVOUS/ANXIOUS: 0
WEAKNESS: 0
PALPITATIONS: 0
ARTHRALGIAS: 0
SORE THROAT: 0
NAUSEA: 0
CONSTIPATION: 0
SHORTNESS OF BREATH: 0

## 2023-04-05 ASSESSMENT — LIFESTYLE VARIABLES
AUDIT-C TOTAL SCORE: 0
SKIP TO QUESTIONS 9-10: 1
HOW OFTEN DO YOU HAVE SIX OR MORE DRINKS ON ONE OCCASION: NEVER
HOW OFTEN DO YOU HAVE A DRINK CONTAINING ALCOHOL: NEVER
HOW MANY STANDARD DRINKS CONTAINING ALCOHOL DO YOU HAVE ON A TYPICAL DAY: PATIENT DOES NOT DRINK

## 2023-04-05 ASSESSMENT — SOCIAL DETERMINANTS OF HEALTH (SDOH)
IN A TYPICAL WEEK, HOW MANY TIMES DO YOU TALK ON THE PHONE WITH FAMILY, FRIENDS, OR NEIGHBORS?: MORE THAN THREE TIMES A WEEK
HOW OFTEN DO YOU GET TOGETHER WITH FRIENDS OR RELATIVES?: THREE TIMES A WEEK
DO YOU BELONG TO ANY CLUBS OR ORGANIZATIONS SUCH AS CHURCH GROUPS UNIONS, FRATERNAL OR ATHLETIC GROUPS, OR SCHOOL GROUPS?: NO
HOW OFTEN DO YOU ATTEND CHURCH OR RELIGIOUS SERVICES?: PATIENT DECLINED

## 2023-04-05 ASSESSMENT — PATIENT HEALTH QUESTIONNAIRE - PHQ9
10. IF YOU CHECKED OFF ANY PROBLEMS, HOW DIFFICULT HAVE THESE PROBLEMS MADE IT FOR YOU TO DO YOUR WORK, TAKE CARE OF THINGS AT HOME, OR GET ALONG WITH OTHER PEOPLE: NOT DIFFICULT AT ALL
SUM OF ALL RESPONSES TO PHQ QUESTIONS 1-9: 0
SUM OF ALL RESPONSES TO PHQ QUESTIONS 1-9: 0

## 2023-04-12 ENCOUNTER — OFFICE VISIT (OUTPATIENT)
Dept: FAMILY MEDICINE | Facility: CLINIC | Age: 33
End: 2023-04-12
Payer: COMMERCIAL

## 2023-04-12 VITALS
RESPIRATION RATE: 16 BRPM | WEIGHT: 196.3 LBS | SYSTOLIC BLOOD PRESSURE: 116 MMHG | HEART RATE: 74 BPM | DIASTOLIC BLOOD PRESSURE: 76 MMHG | OXYGEN SATURATION: 100 % | BODY MASS INDEX: 29.07 KG/M2 | HEIGHT: 69 IN | TEMPERATURE: 98 F

## 2023-04-12 DIAGNOSIS — Z23 ENCOUNTER FOR IMMUNIZATION: Primary | ICD-10-CM

## 2023-04-12 DIAGNOSIS — M77.12 LEFT LATERAL EPICONDYLITIS: ICD-10-CM

## 2023-04-12 DIAGNOSIS — Z00.00 ENCOUNTER FOR PREVENTATIVE ADULT HEALTH CARE EXAMINATION: ICD-10-CM

## 2023-04-12 DIAGNOSIS — M25.562 LEFT KNEE PAIN, UNSPECIFIED CHRONICITY: ICD-10-CM

## 2023-04-12 PROCEDURE — 99213 OFFICE O/P EST LOW 20 MIN: CPT | Mod: 25 | Performed by: FAMILY MEDICINE

## 2023-04-12 PROCEDURE — 90471 IMMUNIZATION ADMIN: CPT | Performed by: FAMILY MEDICINE

## 2023-04-12 PROCEDURE — 99395 PREV VISIT EST AGE 18-39: CPT | Mod: 25 | Performed by: FAMILY MEDICINE

## 2023-04-12 PROCEDURE — 0134A COVID-19 VACCINE BIVALENT BOOSTER 18+ (MODERNA): CPT | Performed by: FAMILY MEDICINE

## 2023-04-12 PROCEDURE — 91313 COVID-19 VACCINE BIVALENT BOOSTER 18+ (MODERNA): CPT | Performed by: FAMILY MEDICINE

## 2023-04-12 PROCEDURE — 90686 IIV4 VACC NO PRSV 0.5 ML IM: CPT | Performed by: FAMILY MEDICINE

## 2023-04-12 ASSESSMENT — ENCOUNTER SYMPTOMS
CONSTIPATION: 0
NERVOUS/ANXIOUS: 0
HEMATOCHEZIA: 0
JOINT SWELLING: 0
WEAKNESS: 0
ABDOMINAL PAIN: 0
MYALGIAS: 0
SORE THROAT: 0
COUGH: 0
PARESTHESIAS: 0
EYE PAIN: 0
CHILLS: 0
NAUSEA: 0
DYSURIA: 0
DIZZINESS: 0
DIARRHEA: 0
PALPITATIONS: 0
HEARTBURN: 0
ARTHRALGIAS: 1
HEADACHES: 0
FREQUENCY: 0
SHORTNESS OF BREATH: 0
HEMATURIA: 0
FEVER: 0

## 2023-04-12 NOTE — ASSESSMENT & PLAN NOTE
Left intermittent knee pain especially when exercising for 2 or 3 months.  No history of trauma no effusion.  No therapies tried.  No pivot shift phenomenon.  Exam finds lax Lachman/anterior drawer with indistinct endpoint asymmetric compared to right.  Broaden database, refer

## 2023-04-12 NOTE — ASSESSMENT & PLAN NOTE
Intermittent discomfort left lateral epicondyles for many months.  Pain to palpation.  Discussed pathophysiology.  Refer for rehabilitative program, modification of weightlifting program

## 2023-04-12 NOTE — PROGRESS NOTES
SUBJECTIVE:   CC: Skyler is an 32 year old who presents for preventative health visit.       4/12/2023     8:41 AM   Additional Questions   Roomed by Ellie Goins   Patient has been advised of split billing requirements and indicates understanding: Yes  Healthy Habits:     Getting at least 3 servings of Calcium per day:  Yes    Bi-annual eye exam:  NO    Dental care twice a year:  Yes    Sleep apnea or symptoms of sleep apnea:  None    Diet:  Regular (no restrictions)    Frequency of exercise:  4-5 days/week    Duration of exercise:  45-60 minutes    Taking medications regularly:  Not Applicable    Medication side effects:  Not applicable and None    PHQ-2 Total Score: 0    Additional concerns today:  Yes              Today's PHQ-2 Score:       4/5/2023     9:22 AM   PHQ-2 ( 1999 Pfizer)   Q1: Little interest or pleasure in doing things 0   Q2: Feeling down, depressed or hopeless 0   PHQ-2 Score 0   Q1: Little interest or pleasure in doing things Nearly every day    Not at all   Q2: Feeling down, depressed or hopeless Not at all    Not at all   PHQ-2 Score 3    0           Social History     Tobacco Use     Smoking status: Never     Smokeless tobacco: Never   Vaping Use     Vaping status: Never Used   Substance Use Topics     Alcohol use: Never             4/5/2023     9:22 AM   Alcohol Use   Prescreen: >3 drinks/day or >7 drinks/week? Not Applicable       Last PSA: No results found for: PSA    Reviewed orders with patient. Reviewed health maintenance and updated orders accordingly - Yes      Reviewed and updated as needed this visit by clinical staff   Tobacco  Allergies  Meds              Reviewed and updated as needed this visit by Provider                     Review of Systems   Constitutional: Negative for chills and fever.   HENT: Negative for congestion, ear pain, hearing loss and sore throat.    Eyes: Negative for pain and visual disturbance.   Respiratory: Negative for cough and shortness of breath.   "  Cardiovascular: Negative for chest pain, palpitations and peripheral edema.   Gastrointestinal: Negative for abdominal pain, constipation, diarrhea, heartburn, hematochezia and nausea.   Genitourinary: Negative for dysuria, frequency, genital sores, hematuria, impotence, penile discharge and urgency.   Musculoskeletal: Positive for arthralgias. Negative for joint swelling and myalgias.   Skin: Negative for rash.   Neurological: Negative for dizziness, weakness, headaches and paresthesias.   Psychiatric/Behavioral: Negative for mood changes. The patient is not nervous/anxious.          OBJECTIVE:   /76 (BP Location: Right arm, Patient Position: Sitting, Cuff Size: Adult Regular)   Pulse 74   Temp 98  F (36.7  C) (Oral)   Resp 16   Ht 1.753 m (5' 9\")   Wt 89 kg (196 lb 4.8 oz)   SpO2 100%   BMI 28.99 kg/m      Physical Exam  Constitutional:       Appearance: Normal appearance.   HENT:      Head: Atraumatic.      Right Ear: Tympanic membrane normal.      Left Ear: Tympanic membrane normal.      Nose: Nose normal.      Mouth/Throat:      Mouth: Mucous membranes are moist.   Eyes:      Pupils: Pupils are equal, round, and reactive to light.   Cardiovascular:      Rate and Rhythm: Normal rate and regular rhythm.      Heart sounds: Normal heart sounds.   Pulmonary:      Effort: Pulmonary effort is normal.      Breath sounds: Normal breath sounds.   Abdominal:      General: Abdomen is flat. Bowel sounds are normal.   Musculoskeletal:      Cervical back: Neck supple.      Comments: As described   Skin:     General: Skin is warm and dry.   Neurological:      General: No focal deficit present.      Mental Status: He is alert.   Psychiatric:         Mood and Affect: Mood normal.               ASSESSMENT/PLAN:     Problem List Items Addressed This Visit     Encounter for immunization - Primary     Offered         Relevant Orders    INFLUENZA VACCINE IM > 6 MONTHS VALENT IIV4 (AFLURIA/FLUZONE) (Completed)    " "COVID-19 VACCINE BIVALENT BOOSTER 18+ (MODERNA) (Completed)    Encounter for preventative adult health care examination    Left knee pain, unspecified chronicity     Left intermittent knee pain especially when exercising for 2 or 3 months.  No history of trauma no effusion.  No therapies tried.  No pivot shift phenomenon.  Exam finds lax Lachman/anterior drawer with indistinct endpoint asymmetric compared to right.  Broaden database, refer         Relevant Orders    MR Knee Left w/o & w Contrast    Orthopedic  Referral    Left lateral epicondylitis     Intermittent discomfort left lateral epicondyles for many months.  Pain to palpation.  Discussed pathophysiology.  Refer for rehabilitative program, modification of weightlifting program         Relevant Orders    Orthopedic  Referral       Patient has been advised of split billing requirements and indicates understanding: Yes      COUNSELING:   Reviewed preventive health counseling, as reflected in patient instructions      BMI:   Estimated body mass index is 28.99 kg/m  as calculated from the following:    Height as of this encounter: 1.753 m (5' 9\").    Weight as of this encounter: 89 kg (196 lb 4.8 oz).   Weight management plan: Uses a treadmill and lifts weights      He reports that he has never smoked. He has never used smokeless tobacco.        Jose Barahona MD  [facility]  Answers for HPI/ROS submitted by the patient on 4/5/2023  If you checked off any problems, how difficult have these problems made it for you to do your work, take care of things at home, or get along with other people?: Not difficult at all  PHQ9 TOTAL SCORE: 0      "

## 2023-04-28 ENCOUNTER — TELEPHONE (OUTPATIENT)
Dept: FAMILY MEDICINE | Facility: CLINIC | Age: 33
End: 2023-04-28
Payer: COMMERCIAL

## 2023-04-28 NOTE — TELEPHONE ENCOUNTER
----- Message from Diana Gómez sent at 2023  9:12 AM CDT -----  Regarding: Denied Services  Insurance Denial Notification    Patient Name:  Skyler York  :    1990  MRN:    [id number]    Dr. Care team,    The authorization for procedure [id number] MR KNEE LEFT W/O & W CONTRAST on date of service 2023 has been denied by the patient's insurance for the following reason:    Denial Reason: The  request cannot be approved because:  Your x-ray results must be unclear (non-diagnostic).  Imaging requires six weeks of provider directed treatment to be completed. This must have  been completed in the past three months without improved symptoms. Contact (via office visit,  phone, email, or messaging) must occur after the treatment is completed. This has not been  met because:  You have not completed six weeks of provider directed treatment.     An appeal can be filed for possible decision reversal. This can take up to 30 days for the insurance to process once submitted.    Below is the information the [facility] provided to the patient's insurance company. If you wish to proceed with an appeal, please provide additional clinical records and a letter explaining why you feel this service is medically necessary.    Original Clinicals Provided:  Order:    MR KNEE LEFT W/O & W CONTRAST  Visit Notes:   2023  Results:   N/A  Other:    N/A        Please let us know how you wish to proceed as soon as possible. We will contact the patient after 1 business day.    Thank you,    Diana Walton Prior Authorization

## 2023-04-28 NOTE — TELEPHONE ENCOUNTER
Please notify patient MRI was denied.     I will forward to PCP. This order was placed while PCP out of office.     Covering for Dr. Barahona while out of office.  HANNY Montague CNP on 4/28/2023 at 10:50 AM

## 2023-04-28 NOTE — TELEPHONE ENCOUNTER
Called patient and left voicemail to call back and ask to speak to any triage nurse.    Harmony Gomez RN

## 2023-05-01 ENCOUNTER — TELEPHONE (OUTPATIENT)
Dept: FAMILY MEDICINE | Facility: CLINIC | Age: 33
End: 2023-05-01
Payer: COMMERCIAL

## 2023-05-01 NOTE — TELEPHONE ENCOUNTER
left message for call back, also called [facility] and informed, they will add note as well, inform pt when calls  Nadeen Ceron RN

## 2023-05-01 NOTE — TELEPHONE ENCOUNTER
Cancel MR knee (tomorrow) (Insurance will not pay)  COntinue with ortho referral  Inform pt  Jose Barahona MD

## 2023-05-01 NOTE — TELEPHONE ENCOUNTER
Pt returns call. Relayed provider message below. Patient was given an opportunity to ask questions, verbalized understanding of plan, and is agreeable.    Harmony Gomez RN

## 2023-05-01 NOTE — TELEPHONE ENCOUNTER
Pt returns call. (see telephone encounter with date 4/28/23 and subject line Formulary issue). Pt informed that insurance will not cover MRI. Cancelled MRI for tomorrow. Relayed referral information below.    [facility]  will call you to coordinate your care as prescribed by your provider. A representative will call you within 2 business days to help you schedule your appointment, or you may contact the  Representative at: [phone].      Patient was given an opportunity to ask questions, verbalized understanding of plan, and is agreeable.    Harmony Gomez RN

## 2023-05-16 ENCOUNTER — OFFICE VISIT (OUTPATIENT)
Dept: ORTHOPEDICS | Facility: CLINIC | Age: 33
End: 2023-05-16
Attending: FAMILY MEDICINE
Payer: COMMERCIAL

## 2023-05-16 VITALS — BODY MASS INDEX: 29.03 KG/M2 | WEIGHT: 196 LBS | HEIGHT: 69 IN

## 2023-05-16 DIAGNOSIS — M76.32 ILIOTIBIAL BAND SYNDROME OF LEFT SIDE: Primary | ICD-10-CM

## 2023-05-16 DIAGNOSIS — M25.562 LEFT KNEE PAIN, UNSPECIFIED CHRONICITY: ICD-10-CM

## 2023-05-16 PROCEDURE — 99203 OFFICE O/P NEW LOW 30 MIN: CPT | Performed by: FAMILY MEDICINE

## 2023-05-16 NOTE — PATIENT INSTRUCTIONS
1. Iliotibial band syndrome of left side    2. Left knee pain, unspecified chronicity      -Patient has chronic left lateral knee pain due to iliotibial band syndrome  -Patient has mild pain with increased activity due to inflammation and tightness of his IT band.  -Patient will start formal physical therapy and home exercise program  -Patient may cross train is walking with low impact exercises such as biking and elliptical  -Patient should also incorporate regular weightbearing strength training exercises  -Patient will follow up if pain does not improve.  No need for advanced imaging at this time  -Call direct clinic number [[phone]] at any time with questions or concerns.    Albert Yeo MD CAQSM  [facility]  [facility]

## 2023-05-16 NOTE — LETTER
5/16/2023         RE: Skyler York  [id number] [facility] [address]        Dear Colleague,    Thank you for referring your patient, Skyler York, to the [facility] [place]. Please see a copy of my visit note below.    ASSESSMENT & PLAN  Patient Instructions     1. Iliotibial band syndrome of left side    2. Left knee pain, unspecified chronicity      -Patient has chronic left lateral knee pain due to iliotibial band syndrome  -Patient has mild pain with increased activity due to inflammation and tightness of his IT band.  -Patient will start formal physical therapy and home exercise program  -Patient may cross train is walking with low impact exercises such as biking and elliptical  -Patient should also incorporate regular weightbearing strength training exercises  -Patient will follow up if pain does not improve.  No need for advanced imaging at this time  -Call direct clinic number [[phone]] at any time with questions or concerns.    Albert Yeo MD [facility] Orthopedics and Sports Medicine  [facility] [place]          -----    SUBJECTIVE  Skyler York is a/an 32 year old male who is seen in consultation at the request of  Jose Barahona M.D. for evaluation of left knee pain. The patient is seen by themselves.    Onset: on and off for 3-4 month(s) ago. Reports insidious onset without acute precipitating event.  Location of Pain: left lateral knee, occasionally left anterior knee  Rating of Pain at worst: 1/10  Rating of Pain Currently: 0/10  Worsened by: pain occurs randomly  Better with: rest / activity avoidance  Treatments tried: rest/activity avoidance  Associated symptoms: no distal numbness or tingling; denies swelling or warmth  Orthopedic history: NO  Relevant surgical history: NO  Social history: social history: works -  - has sit / stand desk    Past Medical History:   Diagnosis Date     Allergic rhinitis       Childhood asthma      Left lateral epicondylitis 4/12/2023     Pain in right testicle 10/14/2020     Panic attack 02/16/2022     Social History     Socioeconomic History     Marital status: Single   Tobacco Use     Smoking status: Never     Smokeless tobacco: Never   Vaping Use     Vaping status: Never Used   Substance and Sexual Activity     Alcohol use: Never     Drug use: No     Sexual activity: Yes     Partners: Female     Birth control/protection: None   Other Topics Concern     Parent/sibling w/ CABG, MI or angioplasty before 65F 55M? No     Social Determinants of Health     Financial Resource Strain: Low Risk  (4/5/2023)    Overall Financial Resource Strain (CARDIA)      Difficulty of Paying Living Expenses: Not hard at all   Food Insecurity: No Food Insecurity (4/5/2023)    Hunger Vital Sign      Worried About Running Out of Food in the Last Year: Never true      Ran Out of Food in the Last Year: Never true   Transportation Needs: No Transportation Needs (4/5/2023)    PRAPARE - Transportation      Lack of Transportation (Medical): No      Lack of Transportation (Non-Medical): No   Physical Activity: Sufficiently Active (4/5/2023)    Exercise Vital Sign      Days of Exercise per Week: 5 days      Minutes of Exercise per Session: 50 min   Stress: No Stress Concern Present (4/5/2023)    [place] [employer] Occupational [employer] - Occupational Stress Questionnaire      Feeling of Stress : Not at all   Social Connections: Unknown (4/5/2023)    Social Connection and Isolation Panel [NHANES]      Frequency of Communication with Friends and Family: More than three times a week      Frequency of Social Gatherings with Friends and Family: Three times a week      Attends Latter day Services: Patient refused      Active Member of Clubs or Organizations: No      Marital Status:    Housing Stability: Low Risk  (4/5/2023)    Housing Stability Vital Sign      Unable to Pay for Housing in the Last Year: No      Number  "of Places Lived in the Last Year: 1      Unstable Housing in the Last Year: No         Patient's past medical, surgical, social, and family histories were reviewed today and no changes are noted.    REVIEW OF SYSTEMS:  10 point ROS is negative other than symptoms noted above in HPI, Past Medical History or as stated below  Constitutional: NEGATIVE for fever, chills, change in weight  Skin: NEGATIVE for worrisome rashes, moles or lesions  GI/: NEGATIVE for bowel or bladder changes  Neuro: NEGATIVE for weakness, dizziness or paresthesias    OBJECTIVE:  Ht 1.753 m (5' 9\")   Wt 88.9 kg (196 lb)   BMI 28.94 kg/m     General: healthy, alert and in no distress  HEENT: no scleral icterus or conjunctival erythema  Skin: no suspicious lesions or rash. No jaundice.  CV: no pedal edema  Resp: normal respiratory effort without conversational dyspnea   Psych: normal mood and affect  Gait: normal steady gait with appropriate coordination and balance  Neuro: Normal light sensory exam of lower extremity  MSK:  LEFT KNEE  Inspection:    normal alignment  Palpation:   bony and ligamentous landmarks are nontender but points to the distal IT band as location of pain.    No effusion is present    Patellofemoral crepitus is Absent  Range of Motion:     00 extension to 1350 flexion  Strength:    Quadriceps grossly intact    Extensor mechanism intact  Special Tests:    Positive: Roni's test    Negative: Patellar grind, patellar apprehension, MCL/valgus stress (0 & 30 deg), LCL/varus stress (0 & 30 deg), Lachman's, anterior drawer, posterior drawer, Moise's    Independent visualization of the below image:  No results found for this or any previous visit (from the past 24 hour(s)).          Albert Yeo MD [facility] Sports and Orthopedic Care        Again, thank you for allowing me to participate in the care of your patient.        Sincerely,        Albert Yeo, MD    "

## 2023-05-16 NOTE — PROGRESS NOTES
ASSESSMENT & PLAN  Patient Instructions     1. Iliotibial band syndrome of left side    2. Left knee pain, unspecified chronicity      -Patient has chronic left lateral knee pain due to iliotibial band syndrome  -Patient has mild pain with increased activity due to inflammation and tightness of his IT band.  -Patient will start formal physical therapy and home exercise program  -Patient may cross train is walking with low impact exercises such as biking and elliptical  -Patient should also incorporate regular weightbearing strength training exercises  -Patient will follow up if pain does not improve.  No need for advanced imaging at this time  -Call direct clinic number [[phone]] at any time with questions or concerns.    Albert Yeo MD [facility] Orthopedics and Sports Medicine  [facility]          -----    SUBJECTIVE  Skyler York is a/an 32 year old male who is seen in consultation at the request of  Jose Barahona M.D. for evaluation of left knee pain. The patient is seen by themselves.    Onset: on and off for 3-4 month(s) ago. Reports insidious onset without acute precipitating event.  Location of Pain: left lateral knee, occasionally left anterior knee  Rating of Pain at worst: 1/10  Rating of Pain Currently: 0/10  Worsened by: pain occurs randomly  Better with: rest / activity avoidance  Treatments tried: rest/activity avoidance  Associated symptoms: no distal numbness or tingling; denies swelling or warmth  Orthopedic history: NO  Relevant surgical history: NO  Social history: social history: works -  - has sit / stand desk    Past Medical History:   Diagnosis Date     Allergic rhinitis      Childhood asthma      Left lateral epicondylitis 4/12/2023     Pain in right testicle 10/14/2020     Panic attack 02/16/2022     Social History     Socioeconomic History     Marital status: Single   Tobacco Use     Smoking status: Never     Smokeless tobacco: Never   Vaping  Use     Vaping status: Never Used   Substance and Sexual Activity     Alcohol use: Never     Drug use: No     Sexual activity: Yes     Partners: Female     Birth control/protection: None   Other Topics Concern     Parent/sibling w/ CABG, MI or angioplasty before 65F 55M? No     Social Determinants of Health     Financial Resource Strain: Low Risk  (4/5/2023)    Overall Financial Resource Strain (CARDIA)      Difficulty of Paying Living Expenses: Not hard at all   Food Insecurity: No Food Insecurity (4/5/2023)    Hunger Vital Sign      Worried About Running Out of Food in the Last Year: Never true      Ran Out of Food in the Last Year: Never true   Transportation Needs: No Transportation Needs (4/5/2023)    PRAPARE - Transportation      Lack of Transportation (Medical): No      Lack of Transportation (Non-Medical): No   Physical Activity: Sufficiently Active (4/5/2023)    Exercise Vital Sign      Days of Exercise per Week: 5 days      Minutes of Exercise per Session: 50 min   Stress: No Stress Concern Present (4/5/2023)    [place] of Occupational Health - Occupational Stress Questionnaire      Feeling of Stress : Not at all   Social Connections: Unknown (4/5/2023)    Social Connection and Isolation Panel [NHANES]      Frequency of Communication with Friends and Family: More than three times a week      Frequency of Social Gatherings with Friends and Family: Three times a week      Attends Worship Services: Patient refused      Active Member of Clubs or Organizations: No      Marital Status:    Housing Stability: Low Risk  (4/5/2023)    Housing Stability Vital Sign      Unable to Pay for Housing in the Last Year: No      Number of Places Lived in the Last Year: 1      Unstable Housing in the Last Year: No         Patient's past medical, surgical, social, and family histories were reviewed today and no changes are noted.    REVIEW OF SYSTEMS:  10 point ROS is negative other than symptoms noted above  "in HPI, Past Medical History or as stated below  Constitutional: NEGATIVE for fever, chills, change in weight  Skin: NEGATIVE for worrisome rashes, moles or lesions  GI/: NEGATIVE for bowel or bladder changes  Neuro: NEGATIVE for weakness, dizziness or paresthesias    OBJECTIVE:  Ht 1.753 m (5' 9\")   Wt 88.9 kg (196 lb)   BMI 28.94 kg/m     General: healthy, alert and in no distress  HEENT: no scleral icterus or conjunctival erythema  Skin: no suspicious lesions or rash. No jaundice.  CV: no pedal edema  Resp: normal respiratory effort without conversational dyspnea   Psych: normal mood and affect  Gait: normal steady gait with appropriate coordination and balance  Neuro: Normal light sensory exam of lower extremity  MSK:  LEFT KNEE  Inspection:    normal alignment  Palpation:   bony and ligamentous landmarks are nontender but points to the distal IT band as location of pain.    No effusion is present    Patellofemoral crepitus is Absent  Range of Motion:     00 extension to 1350 flexion  Strength:    Quadriceps grossly intact    Extensor mechanism intact  Special Tests:    Positive: Roni's test    Negative: Patellar grind, patellar apprehension, MCL/valgus stress (0 & 30 deg), LCL/varus stress (0 & 30 deg), Lachman's, anterior drawer, posterior drawer, Moise's    Independent visualization of the below image:  No results found for this or any previous visit (from the past 24 hour(s)).          Albert Yeo MD [facility] Sports and Orthopedic Care    "

## 2023-06-06 ENCOUNTER — OFFICE VISIT (OUTPATIENT)
Dept: URGENT CARE | Facility: URGENT CARE | Age: 33
End: 2023-06-06
Payer: COMMERCIAL

## 2023-06-06 VITALS
HEART RATE: 72 BPM | RESPIRATION RATE: 12 BRPM | SYSTOLIC BLOOD PRESSURE: 130 MMHG | OXYGEN SATURATION: 100 % | DIASTOLIC BLOOD PRESSURE: 80 MMHG | TEMPERATURE: 98.1 F

## 2023-06-06 DIAGNOSIS — B02.9 HERPES ZOSTER WITHOUT COMPLICATION: Primary | ICD-10-CM

## 2023-06-06 PROCEDURE — 99213 OFFICE O/P EST LOW 20 MIN: CPT | Performed by: FAMILY MEDICINE

## 2023-06-06 RX ORDER — VALACYCLOVIR HYDROCHLORIDE 1 G/1
1000 TABLET, FILM COATED ORAL 3 TIMES DAILY
Qty: 21 TABLET | Refills: 0 | Status: SHIPPED | OUTPATIENT
Start: 2023-06-06 | End: 2023-10-23

## 2023-06-06 NOTE — PROGRESS NOTES
SUBJECTIVE:  Chief Complaint   Patient presents with     Urgent Care     Derm Problem     Rash on left side of abdomen-some discomfort and blisters-concerns of bed bugs      Skyler York is a 32 year old male who presents with a chief complaint of rash on left side of abdomen.    Symptoms present for 3 days.  No one else with similar symptoms.  Developed blister yesterday.  Has been applying topical hydrocortisone.  Wondering if this is bed bugs, was at the hospital recently.    Stressful event - wife was induced due to pregnancy complication, ended up with emergency .  Wife and baby doing well now, baby is 6 days old.    Had chickenpox when he was a child.     Past Medical History:   Diagnosis Date     Allergic rhinitis      Childhood asthma      Left lateral epicondylitis 2023     Pain in right testicle 10/14/2020     Panic attack 2022     No current outpatient medications on file.     Social History     Tobacco Use     Smoking status: Never     Smokeless tobacco: Never   Vaping Use     Vaping status: Never Used   Substance Use Topics     Alcohol use: Never       ROS:  Review of systems negative except as stated above.    EXAM:   /80   Pulse 72   Temp 98.1  F (36.7  C) (Tympanic)   Resp 12   SpO2 100%   GENERAL APPEARANCE: healthy, alert and no distress  SKIN: clustering vesicles on left abdomen on redden patch, dermatomal pattern with similar maculovesicular lesions on back which are starting to scab, does not cross midline  PSYCH:alert, affect bright      ASSESSMENT/PLAN:  (B02.9) Herpes zoster without complication  (primary encounter diagnosis)  Comment: left back/abdomen  Plan: valACYclovir (VALTREX) 1000 mg tablet            Discussed shingles, RX Valtrex given for treatment.  Okay to use benadryl for itchiness and capsaicin cream if having more burning discomfort.  Keep clothing on to avoid contact with infant.    Follow up with primary provider if any further concerns    Rodriguez  MD Justen  June 6, 2023 12:28 PM

## 2023-10-23 ENCOUNTER — APPOINTMENT (OUTPATIENT)
Dept: LAB | Facility: CLINIC | Age: 33
End: 2023-10-23
Payer: COMMERCIAL

## 2023-10-23 ENCOUNTER — OFFICE VISIT (OUTPATIENT)
Dept: FAMILY MEDICINE | Facility: CLINIC | Age: 33
End: 2023-10-23
Payer: COMMERCIAL

## 2023-10-23 VITALS
HEART RATE: 76 BPM | SYSTOLIC BLOOD PRESSURE: 121 MMHG | WEIGHT: 197 LBS | OXYGEN SATURATION: 99 % | TEMPERATURE: 98.4 F | BODY MASS INDEX: 28.2 KG/M2 | HEIGHT: 70 IN | DIASTOLIC BLOOD PRESSURE: 82 MMHG | RESPIRATION RATE: 12 BRPM

## 2023-10-23 DIAGNOSIS — Z23 ENCOUNTER FOR IMMUNIZATION: ICD-10-CM

## 2023-10-23 DIAGNOSIS — Z13.9 ENCOUNTER FOR SCREENING INVOLVING SOCIAL DETERMINANTS OF HEALTH (SDOH): Primary | ICD-10-CM

## 2023-10-23 DIAGNOSIS — R14.0 ABDOMINAL BLOATING: ICD-10-CM

## 2023-10-23 LAB
BASOPHILS # BLD AUTO: 0 10E3/UL (ref 0–0.2)
BASOPHILS NFR BLD AUTO: 0 %
EOSINOPHIL # BLD AUTO: 0.3 10E3/UL (ref 0–0.7)
EOSINOPHIL NFR BLD AUTO: 4 %
ERYTHROCYTE [DISTWIDTH] IN BLOOD BY AUTOMATED COUNT: 11.7 % (ref 10–15)
HCT VFR BLD AUTO: 40.9 % (ref 40–53)
HGB BLD-MCNC: 13.8 G/DL (ref 13.3–17.7)
IMM GRANULOCYTES # BLD: 0 10E3/UL
IMM GRANULOCYTES NFR BLD: 0 %
LYMPHOCYTES # BLD AUTO: 2 10E3/UL (ref 0.8–5.3)
LYMPHOCYTES NFR BLD AUTO: 36 %
MCH RBC QN AUTO: 28.9 PG (ref 26.5–33)
MCHC RBC AUTO-ENTMCNC: 33.7 G/DL (ref 31.5–36.5)
MCV RBC AUTO: 86 FL (ref 78–100)
MONOCYTES # BLD AUTO: 0.5 10E3/UL (ref 0–1.3)
MONOCYTES NFR BLD AUTO: 9 %
NEUTROPHILS # BLD AUTO: 2.8 10E3/UL (ref 1.6–8.3)
NEUTROPHILS NFR BLD AUTO: 50 %
PLATELET # BLD AUTO: 246 10E3/UL (ref 150–450)
RBC # BLD AUTO: 4.77 10E6/UL (ref 4.4–5.9)
WBC # BLD AUTO: 5.7 10E3/UL (ref 4–11)

## 2023-10-23 PROCEDURE — 83690 ASSAY OF LIPASE: CPT | Performed by: FAMILY MEDICINE

## 2023-10-23 PROCEDURE — 36415 COLL VENOUS BLD VENIPUNCTURE: CPT | Performed by: FAMILY MEDICINE

## 2023-10-23 PROCEDURE — 80053 COMPREHEN METABOLIC PANEL: CPT | Performed by: FAMILY MEDICINE

## 2023-10-23 PROCEDURE — 85025 COMPLETE CBC W/AUTO DIFF WBC: CPT | Performed by: FAMILY MEDICINE

## 2023-10-23 PROCEDURE — 99214 OFFICE O/P EST MOD 30 MIN: CPT | Performed by: FAMILY MEDICINE

## 2023-10-23 NOTE — PROGRESS NOTES
"  Assessment & Plan   Problem List Items Addressed This Visit       Abdominal bloating     Onset of symptom complex 6 days ago with abdominal bloating cramps low-grade fever sweats disturbs sleep.  No further low-grade fevers, intermittent bloating and cramps.  2 days of yellow stool with a \"oily\" appearance.  Abdomen benign.  Suspect viral syndrome.  Discussed.  Broaden database         Relevant Orders    CBC with platelets and differential (Completed)    Comprehensive metabolic panel (BMP + Alb, Alk Phos, ALT, AST, Total. Bili, TP)    Lipase    Encounter for immunization     Recommended when well          Other Visit Diagnoses       Encounter for screening involving social determinants of health (SDoH)    -  Primary                      BMI:   Estimated body mass index is 28.27 kg/m  as calculated from the following:    Height as of this encounter: 1.778 m (5' 10\").    Weight as of this encounter: 89.4 kg (197 lb).   Weight management plan: Maintain        Jose Barahona MD  [facility] MUSA Holland is a 32 year old, presenting for the following health issues:  Abdominal Pain      History of Present Illness       Reason for visit:  Gas, indigestion, fatigue, night sweats, yellow stool, bloating  Symptom onset:  3-7 days ago  Symptoms include:  Gas, indigestion, fatigue, night sweats, yellow stool, bloating  Symptom intensity:  Moderate  Symptom progression:  Staying the same  Had these symptoms before:  No    He eats 2-3 servings of fruits and vegetables daily.He consumes 0 sweetened beverage(s) daily.He exercises with enough effort to increase his heart rate 30 to 60 minutes per day.  He exercises with enough effort to increase his heart rate 5 days per week.   He is taking medications regularly.         Pain History:  When did you first notice your pain? 5 days   Have you seen anyone else for your pain? No  Where in your body do you have pain? Abdominal/Flank Pain, Discolored " "stool (yellow)  Onset/Duration: 3-7 days  Description:   Character: Sharp and horrible has pains  Location: right lower quadrant left lower quadrant  Radiation: None  Intensity: moderate  Progression of Symptoms:  intermittent  Accompanying Signs & Symptoms:  Fever/Chills: YES- the first day last Tuesday,   Gas/Bloating: YES  Nausea: YES  Vomitting: No  Diarrhea: No  Constipation: No  Dysuria or Hematuria: No  History:   Trauma: No  Previous similar pain: No  Previous tests done:   Precipitating factors:   Does the pain change with:     Food: No    Bowel Movement: YES- little better, night seems to be the worst and mornings    Urination: No   Other factors:  No  Therapies tried and outcome: none          Review of Systems   No cough no dysuria      Objective    /82 (BP Location: Right arm, Patient Position: Chair, Cuff Size: Adult Large)   Pulse 76   Temp 98.4  F (36.9  C) (Oral)   Resp 12   Ht 1.778 m (5' 10\")   Wt 89.4 kg (197 lb)   SpO2 99%   BMI 28.27 kg/m    Body mass index is 28.27 kg/m .  Physical Exam       Abdomen with positive bowel sounds no organomegaly guarding mass or rigidity tenderness    Jose Barahona MD                    "

## 2023-10-23 NOTE — PROGRESS NOTES
Answers submitted by the patient for this visit:  General Questionnaire (Submitted on 10/23/2023)  Chief Complaint: Chronic problems general questions HPI Form  How many servings of fruits and vegetables do you eat daily?: 2-3  On average, how many sweetened beverages do you drink each day (Examples: soda, juice, sweet tea, etc.  Do NOT count diet or artificially sweetened beverages)?: 0  How many minutes a day do you exercise enough to make your heart beat faster?: 30 to 60  How many days a week do you exercise enough to make your heart beat faster?: 5  How many days per week do you miss taking your medication?: 0  General Concern (Submitted on 10/23/2023)  Chief Complaint: Chronic problems general questions HPI Form  What is the reason for your visit today?: Gas, indigestion, fatigue, night sweats, yellow stool, bloating  When did your symptoms begin?: 3-7 days ago  What are your symptoms?: Gas, indigestion, fatigue, night sweats, yellow stool, bloating  How would you describe these symptoms?: Moderate  Are your symptoms:: Staying the same  Have you had these symptoms before?: No  Jose Barahona MD

## 2023-10-23 NOTE — COMMUNITY RESOURCES LIST (ENGLISH)
10/23/2023   [facility] - Outpatient Clinics  N/A  For additional resource needs, please contact your health insurance member services or your primary care team.  Phone: [phone]   Email: N/A   Address: [address] [place] [address]   Hours: N/A        Hotlines and Helplines       Hotline - Housing crisis  1  [facility] (Main Office) - Emergency Services Distance: 12.25 miles      Phone/Virtual   [address] [place] [address]  Language: English  Hours: Mon - Sun Open 24 Hours   Phone: (812) 197-3071 Email: info@[facility].org Website: http://[facility].org     2  [facility] Distance: 19.25 miles      Phone/Virtual   [address] [place] [address]  Language: English  Hours: Mon - Sun Open 24 Hours   Phone: (883) 136-9268 Email: communications@[facility].org Website: https://[facility]als-[place].org/oursaviourshousing/          Housing       Coordinated Entry access point  3  [facility] ([facility] - Housing Services Distance: 19.15 miles      In-Person   [address] [place] [address]  Language: English  Hours: Mon - Fri 9:00 AM - 5:00 PM  Fees: Free   Phone: (265) 669-8414 Email: housing@[facility].org Website: http://www.[facility].org/housing     4  [facility] Distance: 19.18 miles      In-Person, Phone/Virtual   [address] [place] [address]  Language: English, Occitan  Hours: Mon - Fri 8:30 AM - 4:30 PM  Fees: Free   Phone: (103) 387-4292 Email: info@[facility].org Website: https://www.[facility].org/locations/[facility]/     Drop-in center or day shelter  5  [facility] Distance: 19.57 miles      In-Person   [address] [place] [address]  Language: English  Hours: Mon - Fri 12:00 PM - 3:00 PM  Fees: Free   Phone: (441) 356-7547 Email: Ginx@[employer] Website: http://[facility]housecommunity.org/     6  [facility] [place] -  [facility] Distance: 19.68 miles      In-Person   [address] [place] [address]  Language: English, Ethiopian, Djiboutian  Hours: Mon - Sat 7:00 AM - 3:00 PM  Fees: Free, Self Pay   Phone: (167) 549-2243 Email: info@[employer] Website: https://www.[employer]/locations/opportunity-center/     Housing search assistance  7  [employer] - https://[employer]/ Distance: 20.43 miles      Phone/Virtual   [address] [place] [address]  Language: English  Hours: Mon - Sun Open 24 Hours   Email: info@[address] Website: https://[employer]     8  [employer] - Online housing search assistance Distance: 20.77 miles      Phone/Virtual   [address] [address]  Language: English, Hmong, Ethiopian, Djiboutian  Hours: Mon - Sun Open 24 Hours   Phone: (542) 643-2388 Email: info@[employer]link.org Website: http://www.housinglink.org/     Shelter for families  9  [facility] Distance: 11.15 miles      In-Person   [address] [place] [address]  Language: English  Hours: Mon - Sun Open 24 Hours  Fees: Free, Sliding Fee   Phone: (695) 156-5842 Ext.1 Email: info@[facility].[employer] Website: http://www.[facility].[employer]     Shelter for individuals  10  [facility] ([facility]) [facility] Distance: 6.55 miles      In-Person   [address], [place] [address]  Language: English, Djiboutian  Hours: Mon - Fri 8:00 AM - 4:30 PM  Fees: Free   Phone: (448) 861-8510 Email: info@[facility].org Website: http://www.[facility].org     11  [facility] [place] [facility] - [facility] [place] [facility] Distance: 19.19 miles      In-Person   [address] [place] [address]  Language: English  Hours: Mon - Sun 5:00 PM - 10:00 AM  Fees: Free, Self Pay   Phone: (455) 673-1828 Email: info@[employer].org Website:  https://www.[employer].org/locations/higher-ground-saintreji/          Important Numbers & Websites       [place] [address] 211itedway.org  Poison Control   (737) 909-4775 [place]  Suicide and Crisis Lifeline   987 988[facility] Healthcare[facility].org  Childhelp [place] Child Abuse Hotline   799.891.2844 Childhelphotline.org  [place] Sexual Assault Hotline   (977) 253-1922 (HOPE) [facility].org  [place] Runaway Safeline   (395) 123-1490 (RUNAWAY) [facility].org  Pregnancy & Postpartum Support [place]   Call/text 212-890-0247 Ppsupport[place].org  Substance Abuse National Helpline ([facility]   257-091-HELP (6088) Findtreatment.gov  Emergency Services   916

## 2023-10-23 NOTE — COMMUNITY RESOURCES LIST (ENGLISH)
10/23/2023   [facility]  N/A  For questions about this resource list or additional care needs, please contact your primary care clinic or care manager.  Phone: [phone]   Email: N/A   Address: [facility] [place] [address]   Hours: N/A        Hotlines and Helplines       Hotline - Housing crisis  1  [facility] (Main Office) - Emergency Services Distance: 12.25 miles      Phone/Virtual   [address] [place] [address]  Language: English  Hours: Mon - Sun Open 24 Hours   Phone: (859) 795-5509 Email: info@[facility].org Website: http://[facility].org     2  [facility] Distance: 19.25 miles      Phone/Virtual   [address] [place] [address]  Language: English  Hours: Mon - Sun Open 24 Hours   Phone: (258) 771-4514 Email: communications@[facility] Website: https://[facility].org/oursaviourshousing/          Housing       Coordinated Entry access point  3  [facility] ([facility] - Housing Services Distance: 19.15 miles      In-Person   [address] [place] [address]  Language: English  Hours: Mon - Fri 9:00 AM - 5:00 PM  Fees: Free   Phone: (482) 581-7546 Email: housing@[facility].org Website: http://www.[facility].org/housing     4  [facility] Distance: 19.18 miles      In-Person, Phone/Virtual   [address] [place] [address]  Language: English, Ugandan  Hours: Mon - Fri 8:30 AM - 4:30 PM  Fees: Free   Phone: (780) 863-6792 Email: info@[facility].org Website: https://www.[facility].org/locations/[facility]/     Drop-in center or day shelter  5  [facility] Distance: 19.57 miles      In-Person   [address] [place] [address]  Language: English  Hours: Mon - Fri 12:00 PM - 3:00 PM  Fees: Free   Phone: (833) 455-5034 Email: radamesmangofizz jobscomelvis@[employer].com Website: http://iFulfillment.org/     6  [facility] [place] -  [facility] Center Distance: 19.68 miles      In-Person   [address] [place] [address]  Language: English, Kazakh, Mosotho  Hours: Mon - Sat 7:00 AM - 3:00 PM  Fees: Free, Self Pay   Phone: (242) 537-2717 Email: info@[employer] Website: https://www.[employer] [facility] [place].[employer]/locations/opportunity-center/     Housing search assistance  7  [facility] - Rental Homes for Future Homebuyers Program Distance: 10.57 miles      Phone/Virtual   [address] [place] [address]  Language: English  Hours: Mon - Fri 8:00 AM - 4:30 PM  Fees: Free   Phone: (170) 255-6628 Email: hra@[facility] Website: https://www.[facility]/hra/[place]-housing-and-hfwgsqvrlzppp-qxdayaekc-std     8  [facility] and Disability Services Distance: 15.44 miles      In-Person   [address] [place] [address]  Language: English  Hours: Mon - Fri 8:00 AM - 4:00 PM  Fees: Free, Insurance, Sliding Fee   Phone: (940) 412-2810 Email: sailaja@[facility]. Website: https://www.co.[facility]./HealthFamily/Disabilities     Shelter for families  9  [facility] Distance: 11.15 miles      In-Person   [address] [place] [address]  Language: English  Hours: Mon - Sun Open 24 Hours  Fees: Free, Sliding Fee   Phone: (443) 940-1129 Ext.1 Email: info@[facility] CenterWhere[facility].[employer] Website: http://www.[facility].org     Shelter for individuals  10  [facility] ([facility] Distance: 6.55 miles      In-Person   [address] [place] [address]  Language: English, Mosotho  Hours: Mon - Fri 8:00 AM - 4:30 PM  Fees: Free   Phone: (717) 987-8473 Email: info@capagency.org Website: http://www.capagency.org     11  [facility] [place] [facility] [place] [facility] [place] [facility] Distance: 19.19 miles      In-Person   [address] [facility] [place] [address]  Language: English  Hours: Mon - Sun 5:00 PM - 10:00 AM  Fees: Free, Self Pay   Phone: (690) 916-6178 Email: info@[employer] Website: https://www.[employer].IM.[employer]/locations/[facility]/          Important Numbers & Websites       Emergency Services   911  [facility]   311  Poison Control   (534) 111-6562  Suicide Prevention Lifeline   (998) 331-4644 (TALK)  Child Abuse Hotline   (405) 898-6169 (4-A-Child)  Sexual Assault Hotline   (149) 511-7206 (HOPE)  National Runaway Safeline   (918) 930-2318 (RUNAWAY)  All-Options Talkline   (654) 320-6989  Substance Abuse Referral   (673) 863-5580 (HELP)

## 2023-10-24 LAB
ALBUMIN SERPL BCG-MCNC: 4.2 G/DL (ref 3.5–5.2)
ALP SERPL-CCNC: 70 U/L (ref 40–129)
ALT SERPL W P-5'-P-CCNC: 73 U/L (ref 0–70)
ANION GAP SERPL CALCULATED.3IONS-SCNC: 8 MMOL/L (ref 7–15)
AST SERPL W P-5'-P-CCNC: 53 U/L (ref 0–45)
BILIRUB SERPL-MCNC: 0.4 MG/DL
BUN SERPL-MCNC: 10.3 MG/DL (ref 6–20)
CALCIUM SERPL-MCNC: 9 MG/DL (ref 8.6–10)
CHLORIDE SERPL-SCNC: 104 MMOL/L (ref 98–107)
CREAT SERPL-MCNC: 0.96 MG/DL (ref 0.67–1.17)
DEPRECATED HCO3 PLAS-SCNC: 27 MMOL/L (ref 22–29)
EGFRCR SERPLBLD CKD-EPI 2021: >90 ML/MIN/1.73M2
GLUCOSE SERPL-MCNC: 94 MG/DL (ref 70–99)
LIPASE SERPL-CCNC: 21 U/L (ref 13–60)
POTASSIUM SERPL-SCNC: 3.9 MMOL/L (ref 3.4–5.3)
PROT SERPL-MCNC: 7.4 G/DL (ref 6.4–8.3)
SODIUM SERPL-SCNC: 139 MMOL/L (ref 135–145)

## 2023-10-24 NOTE — ASSESSMENT & PLAN NOTE
"Onset of symptom complex 6 days ago with abdominal bloating cramps low-grade fever sweats disturbs sleep.  No further low-grade fevers, intermittent bloating and cramps.  2 days of yellow stool with a \"oily\" appearance.  Abdomen benign.  Suspect viral syndrome.  Discussed.  Broaden database  "

## 2023-10-26 DIAGNOSIS — R74.01 ELEVATED TRANSAMINASE MEASUREMENT: Primary | ICD-10-CM

## 2023-10-26 NOTE — RESULT ENCOUNTER NOTE
All tests are normal, except 2 liver tests, which are barely off. We recommend rechecking in 3 months, further evaluation if not resolved. This usually ends up being fatty liver  Jose Barahona MD

## 2024-03-13 ENCOUNTER — PATIENT OUTREACH (OUTPATIENT)
Dept: CARE COORDINATION | Facility: CLINIC | Age: 34
End: 2024-03-13
Payer: COMMERCIAL

## 2024-07-14 ENCOUNTER — HEALTH MAINTENANCE LETTER (OUTPATIENT)
Age: 34
End: 2024-07-14

## 2024-07-18 ENCOUNTER — OFFICE VISIT (OUTPATIENT)
Dept: URGENT CARE | Facility: URGENT CARE | Age: 34
End: 2024-07-18
Payer: COMMERCIAL

## 2024-07-18 VITALS — RESPIRATION RATE: 18 BRPM | HEART RATE: 69 BPM | TEMPERATURE: 97.1 F | OXYGEN SATURATION: 100 %

## 2024-07-18 DIAGNOSIS — H60.332 ACUTE SWIMMER'S EAR OF LEFT SIDE: Primary | ICD-10-CM

## 2024-07-18 PROCEDURE — 99213 OFFICE O/P EST LOW 20 MIN: CPT | Performed by: PHYSICIAN ASSISTANT

## 2024-07-18 RX ORDER — NEOMYCIN SULFATE, POLYMYXIN B SULFATE, HYDROCORTISONE 3.5; 10000; 1 MG/ML; [USP'U]/ML; MG/ML
3 SOLUTION/ DROPS AURICULAR (OTIC) 4 TIMES DAILY
Qty: 10 ML | Refills: 0 | Status: SHIPPED | OUTPATIENT
Start: 2024-07-18 | End: 2024-09-24

## 2024-07-18 ASSESSMENT — ENCOUNTER SYMPTOMS
FEVER: 0
SINUS PAIN: 0
RHINORRHEA: 0
SINUS PRESSURE: 0
SORE THROAT: 0

## 2024-07-18 NOTE — PROGRESS NOTES
Assessment & Plan:        ICD-10-CM    1. Acute swimmer's ear of left side  H60.332 neomycin-polymyxin-hydrocortisone (CORTISPORIN) 3.5-68833-4 otic solution            Plan/Clinical Decision Making:    Patient with muffled hearing for two days. Normal TM, no wax. Erythema of canal. Recent swimming on Friday. Will treat for otitis externa with cortisporin otic drops. Keep ear dry.       Return if symptoms worsen or fail to improve, for in 3-5 days.     At the end of the encounter, I discussed results, diagnosis, medications. Discussed red flags for immediate return to clinic/ER, as well as indications for follow up if no improvement. Patient understood and agreed to plan. Patient was stable for discharge.        Renee Harris PA-C on 7/18/2024 at 12:12 PM          Subjective:     HPI:    Skyler is a 33 year old male who presents to clinic today for the following health issues:  Chief Complaint   Patient presents with    Urgent Care     Pt states left ear is plugged X 2 days.     HPI    Muffled hearing left ear. No pain. No fever or illness.   Swimming Friday night.   Doesn't use qtips.     Review of Systems   Constitutional:  Negative for fever.   HENT:  Negative for congestion, ear pain, rhinorrhea, sinus pressure, sinus pain and sore throat.          Patient Active Problem List   Diagnosis    Panic attack    Left lateral epicondylitis    Left knee pain, unspecified chronicity    Encounter for immunization    Encounter for preventative adult health care examination    Abdominal bloating    Elevated transaminase measurement        Past Medical History:   Diagnosis Date    Abdominal bloating 10/23/2023    Allergic rhinitis     Childhood asthma     Elevated transaminase measurement 10/26/2023    Left lateral epicondylitis 04/12/2023    Nonspecific elevation of levels of transaminase and lactic acid dehydrogenase (LDH) 10/26/2023    Pain in right testicle 10/14/2020    Panic attack 02/16/2022       Social History      Tobacco Use    Smoking status: Never    Smokeless tobacco: Never   Substance Use Topics    Alcohol use: Never             Objective:     Vitals:    07/18/24 1207   Pulse: 69   Resp: 18   Temp: 97.1  F (36.2  C)   TempSrc: Tympanic   SpO2: 100%         Physical Exam   EXAM:   Pleasant, alert, appropriate appearance. NAD.  Head Exam: Normocephalic, atraumatic.  Eye Exam:  , non icteric/injection.    Ear Exam: TMs grey without bulging. Left canal with erythema.  Normal pinna.  Nose Exam: Normal external nose.    OroPharynx Exam:  Moist mucous membranes. No erythema, pharynx without exudate or hypertrophy.  Neck/Thyroid Exam:  No LAD.  Skin: no rash or lesion.      Results:  No results found for any visits on 07/18/24.

## 2024-09-24 ENCOUNTER — OFFICE VISIT (OUTPATIENT)
Dept: FAMILY MEDICINE | Facility: CLINIC | Age: 34
End: 2024-09-24
Payer: COMMERCIAL

## 2024-09-24 VITALS
BODY MASS INDEX: 27.06 KG/M2 | RESPIRATION RATE: 16 BRPM | OXYGEN SATURATION: 98 % | WEIGHT: 189 LBS | SYSTOLIC BLOOD PRESSURE: 126 MMHG | HEIGHT: 70 IN | HEART RATE: 66 BPM | DIASTOLIC BLOOD PRESSURE: 82 MMHG | TEMPERATURE: 97.6 F

## 2024-09-24 DIAGNOSIS — N48.0 LICHEN SCLEROSUS OF PENIS: Primary | ICD-10-CM

## 2024-09-24 PROCEDURE — 99213 OFFICE O/P EST LOW 20 MIN: CPT | Mod: 25 | Performed by: FAMILY MEDICINE

## 2024-09-24 PROCEDURE — 90471 IMMUNIZATION ADMIN: CPT | Performed by: FAMILY MEDICINE

## 2024-09-24 PROCEDURE — 90715 TDAP VACCINE 7 YRS/> IM: CPT | Performed by: FAMILY MEDICINE

## 2024-09-24 RX ORDER — CLOBETASOL PROPIONATE 0.5 MG/G
OINTMENT TOPICAL
Qty: 15 G | Refills: 0 | Status: SHIPPED | OUTPATIENT
Start: 2024-09-24

## 2024-09-24 NOTE — PROGRESS NOTES
"  Assessment & Plan     Lichen sclerosus of penis  Isolated episode, currently asymptomatic.  If this occurs again would recommend topical clobetasol twice daily for a week and then once daily for a week and then stop.  Reassured patient exam findings not suggestive of true sexually-transmitted infection.  - clobetasol (TEMOVATE) 0.05 % external ointment  Dispense: 15 g; Refill: 0            BMI  Estimated body mass index is 27.12 kg/m  as calculated from the following:    Height as of this encounter: 1.778 m (5' 10\").    Weight as of this encounter: 85.7 kg (189 lb).             Abbe Holland is a 33 year old, presenting for the following health issues:  Derm Problem (Evaluate skin lesion on end of penis)        9/24/2024    11:13 AM   Additional Questions   Roomed by Angeles Conde     History of Present Illness       Reason for visit:  Skin growth on end of penis and sensitive  Symptom onset:  1-2 weeks ago  Symptoms include:  Sensitivity and itchiness  Symptom intensity:  Mild  Symptom progression:  Staying the same  Had these symptoms before:  No  What makes it worse:  No  What makes it better:  No   He is taking medications regularly.         Developed itchiness and sensitivity on the tip of his penis approximately couple weeks ago that lasted for about a week and then currently has resolved.  He is currently  to his wife, no painful urination or urethral discharge.      Objective    /82 (BP Location: Right arm, Patient Position: Chair, Cuff Size: Adult Large)   Pulse 66   Temp 97.6  F (36.4  C) (Oral)   Resp 16   Ht 1.778 m (5' 10\")   Wt 85.7 kg (189 lb)   SpO2 98%   BMI 27.12 kg/m    Body mass index is 27.12 kg/m .  Physical Exam  Genitourinary:     Testes: Normal.          Comments: 0.5 cm white macule, nontender                   Signed Electronically by: Deric Irwin MD    " Confirmed that patient received an additional set of vital signs prior to discharge.

## 2024-10-09 ENCOUNTER — PATIENT OUTREACH (OUTPATIENT)
Dept: CARE COORDINATION | Facility: CLINIC | Age: 34
End: 2024-10-09

## 2024-10-10 ENCOUNTER — ANCILLARY PROCEDURE (OUTPATIENT)
Dept: GENERAL RADIOLOGY | Facility: CLINIC | Age: 34
End: 2024-10-10
Attending: PHYSICIAN ASSISTANT
Payer: COMMERCIAL

## 2024-10-10 ENCOUNTER — OFFICE VISIT (OUTPATIENT)
Dept: URGENT CARE | Facility: URGENT CARE | Age: 34
End: 2024-10-10
Payer: COMMERCIAL

## 2024-10-10 VITALS
DIASTOLIC BLOOD PRESSURE: 80 MMHG | OXYGEN SATURATION: 100 % | SYSTOLIC BLOOD PRESSURE: 123 MMHG | TEMPERATURE: 97.5 F | HEART RATE: 78 BPM | RESPIRATION RATE: 16 BRPM

## 2024-10-10 DIAGNOSIS — J45.21 MILD INTERMITTENT ASTHMA WITH ACUTE EXACERBATION: Primary | ICD-10-CM

## 2024-10-10 DIAGNOSIS — J40 BRONCHITIS WITH ACUTE WHEEZING: ICD-10-CM

## 2024-10-10 PROCEDURE — 94640 AIRWAY INHALATION TREATMENT: CPT | Performed by: PHYSICIAN ASSISTANT

## 2024-10-10 PROCEDURE — 99214 OFFICE O/P EST MOD 30 MIN: CPT | Mod: 25 | Performed by: PHYSICIAN ASSISTANT

## 2024-10-10 PROCEDURE — 71046 X-RAY EXAM CHEST 2 VIEWS: CPT | Mod: TC | Performed by: RADIOLOGY

## 2024-10-10 RX ORDER — IPRATROPIUM BROMIDE AND ALBUTEROL SULFATE 2.5; .5 MG/3ML; MG/3ML
3 SOLUTION RESPIRATORY (INHALATION) ONCE
Status: COMPLETED | OUTPATIENT
Start: 2024-10-10 | End: 2024-10-10

## 2024-10-10 RX ORDER — PREDNISONE 20 MG/1
40 TABLET ORAL DAILY
Qty: 10 TABLET | Refills: 0 | Status: SHIPPED | OUTPATIENT
Start: 2024-10-10 | End: 2024-10-15

## 2024-10-10 RX ORDER — IPRATROPIUM BROMIDE AND ALBUTEROL SULFATE 2.5; .5 MG/3ML; MG/3ML
1 SOLUTION RESPIRATORY (INHALATION) EVERY 6 HOURS PRN
Qty: 90 ML | Refills: 0 | Status: SHIPPED | OUTPATIENT
Start: 2024-10-10

## 2024-10-10 RX ORDER — ALBUTEROL SULFATE 90 UG/1
2 INHALANT RESPIRATORY (INHALATION) EVERY 6 HOURS PRN
Qty: 18 G | Refills: 0 | Status: SHIPPED | OUTPATIENT
Start: 2024-10-10

## 2024-10-10 RX ADMIN — IPRATROPIUM BROMIDE AND ALBUTEROL SULFATE 3 ML: 2.5; .5 SOLUTION RESPIRATORY (INHALATION) at 14:39

## 2024-10-10 ASSESSMENT — ENCOUNTER SYMPTOMS
WHEEZING: 1
FEVER: 0
COUGH: 1
SORE THROAT: 0
RHINORRHEA: 1
SHORTNESS OF BREATH: 1

## 2024-10-10 NOTE — PROGRESS NOTES
Assessment & Plan:        ICD-10-CM    1. Mild intermittent asthma with acute exacerbation  J45.21 XR Chest 2 Views     ipratropium - albuterol 0.5 mg/2.5 mg/3 mL (DUONEB) neb solution 3 mL     ipratropium - albuterol 0.5 mg/2.5 mg/3 mL (DUONEB) 0.5-2.5 (3) MG/3ML neb solution     predniSONE (DELTASONE) 20 MG tablet     albuterol (PROAIR HFA/PROVENTIL HFA/VENTOLIN HFA) 108 (90 Base) MCG/ACT inhaler            Plan/Clinical Decision Making:    Patient presents with cough for two weeks. Having ongoing cough, wheezing. Hx of asthma as child. Wheezing bilaterally on exam.   I independently visualized the xray:  no infiltrate or opacity seen.   Duo neb done and improved symptoms. Can use duo nebs at home. Albuterol inhaler prn. , course of prednisone prescribed. Reviewed side effects.  Responded very well to the DuoNeb so if lung symptoms are proving with nebs and albuterol , no need to start prednisone but if not improving in a day or 2 can go ahead and start the course of prednisone.  Patient Instructions   Start using nebs several times a day.     If you have persistent symptoms start course of prednisone. Take in morning daily.     Use albuterol inhaler.       I independently visualized the xray: no infiltrate or area of opacity seen. Compared to previous CXR and no changes seen.       No follow-ups on file.     At the end of the encounter, I discussed results, diagnosis, medications. Discussed red flags for immediate return to clinic/ER, as well as indications for follow up if no improvement. Patient understood and agreed to plan. Patient was stable for discharge.        Renee Harris PA-C on 10/10/2024 at 2:18 PM          Subjective:     HPI:    Skyler is a 33 year old male who presents to clinic today for the following health issues:  Chief Complaint   Patient presents with    Cough     2 weeks, chest crackles, sob, wheezing     HPI    Patient complains of ongoing cough for 2 weeks.   Had childhood asthma.    Occasional seasonal allergies. Hasn't had to take anything for a long time.   Rest of family had similar symptoms.     Review of Systems   Constitutional:  Negative for fever.   HENT:  Positive for congestion (mild) and rhinorrhea (mild). Negative for sore throat.    Respiratory:  Positive for cough, shortness of breath and wheezing (mild).          Patient Active Problem List   Diagnosis    Panic attack    Left lateral epicondylitis    Left knee pain, unspecified chronicity    Encounter for immunization    Encounter for preventative adult health care examination    Abdominal bloating    Elevated transaminase measurement        Past Medical History:   Diagnosis Date    Abdominal bloating 10/23/2023    Allergic rhinitis     Childhood asthma     Elevated transaminase measurement 10/26/2023    Left lateral epicondylitis 04/12/2023    Nonspecific elevation of levels of transaminase and lactic acid dehydrogenase (LDH) 10/26/2023    Pain in right testicle 10/14/2020    Panic attack 02/16/2022       Social History     Tobacco Use    Smoking status: Never     Passive exposure: Never    Smokeless tobacco: Never   Substance Use Topics    Alcohol use: Never             Objective:     Vitals:    10/10/24 1416   BP: 123/80   Pulse: 78   Resp: 16   Temp: 97.5  F (36.4  C)   SpO2: 100%         Physical Exam   EXAM:   Pleasant, alert, appropriate appearance. NAD.  Head Exam: Normocephalic, atraumatic.  Eye Exam:   non icteric/injection.    Ear Exam: TMs grey without bulging. Normal canals.  Normal pinna.  Nose Exam: Normal external nose.    OroPharynx Exam:  Moist mucous membranes. No erythema, pharynx without exudate or hypertrophy.  Neck/Thyroid Exam:  No LAD.    Chest/Respiratory Exam: bilateral wheezing, mild rhonchi.   Cardiovascular Exam: RRR. No murmur or rubs.      Results:  Results for orders placed or performed in visit on 10/10/24   XR Chest 2 Views     Status: None    Narrative    CHEST TWO VIEWS 10/10/2024 2:36 PM      HISTORY: Bronchitis with acute wheezing    COMPARISON: April 25, 2021       Impression    IMPRESSION: There are no acute infiltrates. The cardiac silhouette is  not enlarged. Pulmonary vasculature is unremarkable.    ANDREAS SANTOS MD         SYSTEM ID:  [id number]

## 2024-12-14 ENCOUNTER — HOSPITAL ENCOUNTER (EMERGENCY)
Facility: CLINIC | Age: 34
Discharge: HOME | End: 2024-12-14
Attending: EMERGENCY MEDICINE | Admitting: EMERGENCY MEDICINE
Payer: COMMERCIAL

## 2024-12-14 VITALS
DIASTOLIC BLOOD PRESSURE: 93 MMHG | SYSTOLIC BLOOD PRESSURE: 142 MMHG | TEMPERATURE: 97.5 F | HEART RATE: 69 BPM | RESPIRATION RATE: 18 BRPM | OXYGEN SATURATION: 99 %

## 2024-12-14 DIAGNOSIS — M54.41 ACUTE RIGHT-SIDED LOW BACK PAIN WITH RIGHT-SIDED SCIATICA: ICD-10-CM

## 2024-12-14 DIAGNOSIS — M53.3 PAIN OF RIGHT SACROILIAC JOINT: ICD-10-CM

## 2024-12-14 PROCEDURE — 99284 EMERGENCY DEPT VISIT MOD MDM: CPT

## 2024-12-14 RX ORDER — METHYLPREDNISOLONE 4 MG/1
TABLET ORAL
Qty: 21 TABLET | Refills: 0 | Status: SHIPPED | OUTPATIENT
Start: 2024-12-14 | End: 2024-12-15

## 2024-12-14 RX ORDER — CYCLOBENZAPRINE HCL 10 MG
5-10 TABLET ORAL 3 TIMES DAILY PRN
Qty: 10 TABLET | Refills: 0 | Status: SHIPPED | OUTPATIENT
Start: 2024-12-14 | End: 2024-12-15

## 2024-12-14 ASSESSMENT — COLUMBIA-SUICIDE SEVERITY RATING SCALE - C-SSRS
6. HAVE YOU EVER DONE ANYTHING, STARTED TO DO ANYTHING, OR PREPARED TO DO ANYTHING TO END YOUR LIFE?: NO
2. HAVE YOU ACTUALLY HAD ANY THOUGHTS OF KILLING YOURSELF IN THE PAST MONTH?: NO
1. IN THE PAST MONTH, HAVE YOU WISHED YOU WERE DEAD OR WISHED YOU COULD GO TO SLEEP AND NOT WAKE UP?: NO

## 2024-12-14 ASSESSMENT — ACTIVITIES OF DAILY LIVING (ADL): ADLS_ACUITY_SCORE: 41

## 2024-12-15 RX ORDER — CYCLOBENZAPRINE HCL 10 MG
5-10 TABLET ORAL 3 TIMES DAILY PRN
Qty: 10 TABLET | Refills: 0 | Status: ON HOLD | OUTPATIENT
Start: 2024-12-15 | End: 2024-12-19

## 2024-12-15 RX ORDER — METHYLPREDNISOLONE 4 MG/1
TABLET ORAL
Qty: 21 TABLET | Refills: 0 | Status: SHIPPED | OUTPATIENT
Start: 2024-12-15

## 2024-12-15 NOTE — ED PROVIDER NOTES
I did not see this patient.  I was asked by pharmacy to change the location as the initial pharmacy is closed.     John Villarreal MD  12/15/24 5839

## 2024-12-15 NOTE — ED PROVIDER NOTES
Emergency Department Note      History of Present Illness     Chief Complaint   Back Pain    HPI   Skyler York is a 34 year old male presenting to the ED for the evaluation of back pain. The patient reports that he has been experiencing progressively worsening back pain throughout the day today. This pain presents in his right lower back and radiates down his right leg. Skyler also endorses some numbness to the area when he sits for long periods of time, as well as worsened pain and tension when he stands up from laying down. He states that he has experienced off-and-on back pain for the last 10 years but denies seeing any providers recently. He denies any medical problems or regular medications. He states that he took analgesics yesterday to attempt pain alleviation. Of note, the patient has been seen by orthopedics providers in the past for his back pain as well as his knee.    Independent Historian   None    Review of External Notes   None    Past Medical History     Medical History and Problem List   Allergic rhinitis  Childhood asthma  Left lateral epicondylitis  Panic attack    Medications   Albuterol  Clobetasol  Ipratropium    Physical Exam     Patient Vitals for the past 24 hrs:   BP Temp Temp src Pulse Resp SpO2   12/14/24 1956 (!) 142/93 97.5  F (36.4  C) Temporal 69 18 99 %     Physical Exam  Constitutional: Alert, attentive, GCS 15   Eyes: EOM are normal, anicteric, conjugate gaze  CV: distal extremities warm, well perfused  Chest: Non-labored breathing on RA  GI:  non tender. No distension. No guarding or rebound.    MSK: Reproducible right-sided gluteal and right sided SI/right lower lumbar muscular tenderness.  No midline tenderness.  Gait is normal, distal strength is intact and normal.  Neurological: Alert, attentive, moving all extremities equally.   Skin: Skin is warm and dry.        ED Course      Medications Administered   Medications - No data to display    Procedures   Procedures      Discussion of Management   None    ED Course   ED Course as of 12/14/24 2040   Sat Dec 14, 2024   2026 I obtained the history and examined the patient as noted above.          Additional Documentation  None    Medical Decision Making / Diagnosis       ELMO York is a 34 year old male reporting over 10 years of right sided low back pain presenting for evaluation of the same. l he has no red flag symptoms, there is no acute injury.  On exam his pain is isolated over the right SI joint extending into the glutes and lumbar paraspinal musculature.  No indication for advanced imaging with low suspicion for cauda equina or emergent central disc herniation he has no weakness numbness saddle anesthesia.  I recommend concern management Tylenol, ibuprofen, Medrol Dosepak as he does have some pain down into his right leg suggestive of sciatica as well as Flexeril.  Recommended gentle stretching,, gentle massage, heat packs and Ortho versus PT follow-up.  Return precautions reviewed and he was discharged.    Disposition   The patient was discharged.     Diagnosis     ICD-10-CM    1. Pain of right sacroiliac joint  M53.3       2. Acute right-sided low back pain with right-sided sciatica  M54.41            Discharge Medications   Discharge Medication List as of 12/14/2024  8:33 PM        START taking these medications    Details   cyclobenzaprine (FLEXERIL) 10 MG tablet Take 0.5-1 tablets (5-10 mg) by mouth 3 times daily as needed., Disp-10 tablet, R-0, E-Prescribe      methylPREDNISolone (MEDROL DOSEPAK) 4 MG tablet therapy pack Follow Package Directions, Disp-21 tablet, R-0, E-Prescribe             Dar Jolly MD  [employer]  8:39 PM 12/14/24     Scribe Disclosure:  Roxanna VICK, am serving as a scribe at 8:36 PM on 12/14/2024 to document services personally performed by Dar Jolly MD based on my observations and the provider's statements to me.        Dar Jolly  MD Pined[place]  12/14/24 2043

## 2024-12-15 NOTE — ED TRIAGE NOTES
Arrives ambulatory from home. States he has lower right back pain. States concerns it is his SI joint.   States this pain started yesterday. Denies trauma or injury to the area.     States took motrin and aleve, last dose around 1200 and 1800.

## 2024-12-15 NOTE — DISCHARGE INSTRUCTIONS
You should make a 1 to follow-up with orthopedics or sports medicine for recheck.  I would also consider gentle stretching, gentle massage, heat packs, I would continue to do Tylenol and ibuprofen.You can use the Flexeril as needed for muscle tightness.  I would also take the Medrol Dosepak as prescribed.      You should return here should you have progressive pain, weakness or numbness in your leg, numbness when you wipe in the bathroom, become incontinent of urine or stool develop high fever

## 2024-12-17 ENCOUNTER — APPOINTMENT (OUTPATIENT)
Dept: MRI IMAGING | Facility: CLINIC | Age: 34
DRG: 552 | End: 2024-12-17
Attending: EMERGENCY MEDICINE
Payer: COMMERCIAL

## 2024-12-17 ENCOUNTER — HOSPITAL ENCOUNTER (INPATIENT)
Facility: CLINIC | Age: 34
LOS: 1 days | Discharge: HOME | End: 2024-12-19
Attending: EMERGENCY MEDICINE | Admitting: INTERNAL MEDICINE
Payer: COMMERCIAL

## 2024-12-17 DIAGNOSIS — M51.26 LUMBAR DISC HERNIATION: ICD-10-CM

## 2024-12-17 LAB
ANION GAP SERPL CALCULATED.3IONS-SCNC: 12 MMOL/L (ref 7–15)
BASOPHILS # BLD AUTO: 0 10E3/UL (ref 0–0.2)
BASOPHILS NFR BLD AUTO: 0 %
BUN SERPL-MCNC: 17.1 MG/DL (ref 6–20)
CALCIUM SERPL-MCNC: 8.9 MG/DL (ref 8.8–10.4)
CHLORIDE SERPL-SCNC: 101 MMOL/L (ref 98–107)
CREAT SERPL-MCNC: 1.18 MG/DL (ref 0.67–1.17)
EGFRCR SERPLBLD CKD-EPI 2021: 83 ML/MIN/1.73M2
EOSINOPHIL # BLD AUTO: 0.1 10E3/UL (ref 0–0.7)
EOSINOPHIL NFR BLD AUTO: 1 %
ERYTHROCYTE [DISTWIDTH] IN BLOOD BY AUTOMATED COUNT: 12.4 % (ref 10–15)
GLUCOSE SERPL-MCNC: 104 MG/DL (ref 70–99)
HCO3 SERPL-SCNC: 26 MMOL/L (ref 22–29)
HCT VFR BLD AUTO: 39.8 % (ref 40–53)
HGB BLD-MCNC: 12.9 G/DL (ref 13.3–17.7)
IMM GRANULOCYTES # BLD: 0 10E3/UL
IMM GRANULOCYTES NFR BLD: 0 %
LYMPHOCYTES # BLD AUTO: 2.9 10E3/UL (ref 0.8–5.3)
LYMPHOCYTES NFR BLD AUTO: 26 %
MCH RBC QN AUTO: 27.9 PG (ref 26.5–33)
MCHC RBC AUTO-ENTMCNC: 32.4 G/DL (ref 31.5–36.5)
MCV RBC AUTO: 86 FL (ref 78–100)
MONOCYTES # BLD AUTO: 1.2 10E3/UL (ref 0–1.3)
MONOCYTES NFR BLD AUTO: 10 %
NEUTROPHILS # BLD AUTO: 7 10E3/UL (ref 1.6–8.3)
NEUTROPHILS NFR BLD AUTO: 62 %
NRBC # BLD AUTO: 0 10E3/UL
NRBC BLD AUTO-RTO: 0 /100
PLATELET # BLD AUTO: 252 10E3/UL (ref 150–450)
POTASSIUM SERPL-SCNC: 3.4 MMOL/L (ref 3.4–5.3)
RBC # BLD AUTO: 4.62 10E6/UL (ref 4.4–5.9)
SODIUM SERPL-SCNC: 139 MMOL/L (ref 135–145)
WBC # BLD AUTO: 11.2 10E3/UL (ref 4–11)

## 2024-12-17 PROCEDURE — 80048 BASIC METABOLIC PNL TOTAL CA: CPT | Performed by: EMERGENCY MEDICINE

## 2024-12-17 PROCEDURE — 85004 AUTOMATED DIFF WBC COUNT: CPT | Performed by: EMERGENCY MEDICINE

## 2024-12-17 PROCEDURE — 36415 COLL VENOUS BLD VENIPUNCTURE: CPT | Performed by: EMERGENCY MEDICINE

## 2024-12-17 PROCEDURE — 96374 THER/PROPH/DIAG INJ IV PUSH: CPT

## 2024-12-17 PROCEDURE — 250N000011 HC RX IP 250 OP 636: Performed by: EMERGENCY MEDICINE

## 2024-12-17 PROCEDURE — 72148 MRI LUMBAR SPINE W/O DYE: CPT

## 2024-12-17 PROCEDURE — 99285 EMERGENCY DEPT VISIT HI MDM: CPT | Mod: 25

## 2024-12-17 RX ADMIN — HYDROMORPHONE HYDROCHLORIDE 1 MG: 1 INJECTION, SOLUTION INTRAMUSCULAR; INTRAVENOUS; SUBCUTANEOUS at 21:39

## 2024-12-17 ASSESSMENT — ACTIVITIES OF DAILY LIVING (ADL)
ADLS_ACUITY_SCORE: 41

## 2024-12-17 ASSESSMENT — COLUMBIA-SUICIDE SEVERITY RATING SCALE - C-SSRS
1. IN THE PAST MONTH, HAVE YOU WISHED YOU WERE DEAD OR WISHED YOU COULD GO TO SLEEP AND NOT WAKE UP?: NO
6. HAVE YOU EVER DONE ANYTHING, STARTED TO DO ANYTHING, OR PREPARED TO DO ANYTHING TO END YOUR LIFE?: NO
2. HAVE YOU ACTUALLY HAD ANY THOUGHTS OF KILLING YOURSELF IN THE PAST MONTH?: NO

## 2024-12-17 NOTE — LETTER
[facility] PEDIATRIC  [address]  [facility] [address]  Phone: [phone]  Fax: [phone]    December 19, 2024        Skyler York  [address] [facility] [address]          To whom it may concern:    RE: Skyler York    Patient was admitted and treated in the hospital for a medical illness.  We are recommending s to continue light duty with no lifting of more than 5 pounds and work at home until optimized and cleared from his outpatient follow up.      Please contact me for questions or concerns.      Sincerely,    Flako Barksdale MD

## 2024-12-18 PROBLEM — M51.26 LUMBAR DISC HERNIATION: Status: ACTIVE | Noted: 2024-12-18

## 2024-12-18 PROCEDURE — 250N000011 HC RX IP 250 OP 636: Performed by: EMERGENCY MEDICINE

## 2024-12-18 PROCEDURE — G0378 HOSPITAL OBSERVATION PER HR: HCPCS

## 2024-12-18 PROCEDURE — 99222 1ST HOSP IP/OBS MODERATE 55: CPT | Performed by: STUDENT IN AN ORGANIZED HEALTH CARE EDUCATION/TRAINING PROGRAM

## 2024-12-18 PROCEDURE — 99207 PR NO CHARGE LOS: CPT | Performed by: INTERNAL MEDICINE

## 2024-12-18 PROCEDURE — 96376 TX/PRO/DX INJ SAME DRUG ADON: CPT

## 2024-12-18 PROCEDURE — 999N000147 HC STATISTIC PT IP EVAL DEFER: Performed by: PHYSICAL THERAPIST

## 2024-12-18 PROCEDURE — 96374 THER/PROPH/DIAG INJ IV PUSH: CPT

## 2024-12-18 PROCEDURE — 120N000004 HC R&B MS OVERFLOW

## 2024-12-18 PROCEDURE — 99221 1ST HOSP IP/OBS SF/LOW 40: CPT

## 2024-12-18 PROCEDURE — 96375 TX/PRO/DX INJ NEW DRUG ADDON: CPT

## 2024-12-18 PROCEDURE — 250N000011 HC RX IP 250 OP 636: Performed by: STUDENT IN AN ORGANIZED HEALTH CARE EDUCATION/TRAINING PROGRAM

## 2024-12-18 PROCEDURE — 250N000013 HC RX MED GY IP 250 OP 250 PS 637: Performed by: STUDENT IN AN ORGANIZED HEALTH CARE EDUCATION/TRAINING PROGRAM

## 2024-12-18 RX ORDER — IBUPROFEN 200 MG
200 TABLET ORAL EVERY 4 HOURS PRN
Status: ON HOLD | COMMUNITY
End: 2024-12-19

## 2024-12-18 RX ORDER — OXYCODONE HYDROCHLORIDE 5 MG/1
5 TABLET ORAL EVERY 4 HOURS PRN
Status: DISCONTINUED | OUTPATIENT
Start: 2024-12-18 | End: 2024-12-19 | Stop reason: HOSPADM

## 2024-12-18 RX ORDER — DEXAMETHASONE SODIUM PHOSPHATE 4 MG/ML
4 INJECTION, SOLUTION INTRA-ARTICULAR; INTRALESIONAL; INTRAMUSCULAR; INTRAVENOUS; SOFT TISSUE EVERY 8 HOURS
Status: DISCONTINUED | OUTPATIENT
Start: 2024-12-18 | End: 2024-12-19 | Stop reason: HOSPADM

## 2024-12-18 RX ORDER — AMOXICILLIN 250 MG
1 CAPSULE ORAL 2 TIMES DAILY PRN
Status: DISCONTINUED | OUTPATIENT
Start: 2024-12-18 | End: 2024-12-19 | Stop reason: HOSPADM

## 2024-12-18 RX ORDER — GABAPENTIN 300 MG/1
300 CAPSULE ORAL 3 TIMES DAILY
Status: DISCONTINUED | OUTPATIENT
Start: 2024-12-18 | End: 2024-12-19

## 2024-12-18 RX ORDER — ONDANSETRON 2 MG/ML
4 INJECTION INTRAMUSCULAR; INTRAVENOUS EVERY 6 HOURS PRN
Status: DISCONTINUED | OUTPATIENT
Start: 2024-12-18 | End: 2024-12-19 | Stop reason: HOSPADM

## 2024-12-18 RX ORDER — NAPROXEN SODIUM 220 MG/1
220 TABLET, FILM COATED ORAL 2 TIMES DAILY PRN
Status: ON HOLD | COMMUNITY
End: 2024-12-19

## 2024-12-18 RX ORDER — NALOXONE HYDROCHLORIDE 0.4 MG/ML
0.4 INJECTION, SOLUTION INTRAMUSCULAR; INTRAVENOUS; SUBCUTANEOUS
Status: DISCONTINUED | OUTPATIENT
Start: 2024-12-18 | End: 2024-12-19 | Stop reason: HOSPADM

## 2024-12-18 RX ORDER — ACETAMINOPHEN 325 MG/1
975 TABLET ORAL 3 TIMES DAILY
Status: DISCONTINUED | OUTPATIENT
Start: 2024-12-18 | End: 2024-12-19 | Stop reason: HOSPADM

## 2024-12-18 RX ORDER — KETOROLAC TROMETHAMINE 15 MG/ML
15 INJECTION, SOLUTION INTRAMUSCULAR; INTRAVENOUS ONCE
Status: COMPLETED | OUTPATIENT
Start: 2024-12-18 | End: 2024-12-18

## 2024-12-18 RX ORDER — METHYLPREDNISOLONE SODIUM SUCCINATE 40 MG/ML
40 INJECTION INTRAMUSCULAR; INTRAVENOUS ONCE
Status: DISCONTINUED | OUTPATIENT
Start: 2024-12-18 | End: 2024-12-18

## 2024-12-18 RX ORDER — KETOROLAC TROMETHAMINE 15 MG/ML
15 INJECTION, SOLUTION INTRAMUSCULAR; INTRAVENOUS EVERY 6 HOURS PRN
Status: COMPLETED | OUTPATIENT
Start: 2024-12-18 | End: 2024-12-18

## 2024-12-18 RX ORDER — NALOXONE HYDROCHLORIDE 0.4 MG/ML
0.2 INJECTION, SOLUTION INTRAMUSCULAR; INTRAVENOUS; SUBCUTANEOUS
Status: DISCONTINUED | OUTPATIENT
Start: 2024-12-18 | End: 2024-12-19 | Stop reason: HOSPADM

## 2024-12-18 RX ORDER — ONDANSETRON 4 MG/1
4 TABLET, ORALLY DISINTEGRATING ORAL EVERY 6 HOURS PRN
Status: DISCONTINUED | OUTPATIENT
Start: 2024-12-18 | End: 2024-12-19 | Stop reason: HOSPADM

## 2024-12-18 RX ORDER — PROCHLORPERAZINE MALEATE 5 MG/1
10 TABLET ORAL EVERY 6 HOURS PRN
Status: DISCONTINUED | OUTPATIENT
Start: 2024-12-18 | End: 2024-12-19 | Stop reason: HOSPADM

## 2024-12-18 RX ORDER — ACETAMINOPHEN 325 MG/1
325-650 TABLET ORAL EVERY 6 HOURS PRN
COMMUNITY

## 2024-12-18 RX ORDER — AMOXICILLIN 250 MG
2 CAPSULE ORAL 2 TIMES DAILY PRN
Status: DISCONTINUED | OUTPATIENT
Start: 2024-12-18 | End: 2024-12-19 | Stop reason: HOSPADM

## 2024-12-18 RX ADMIN — KETOROLAC TROMETHAMINE 15 MG: 15 INJECTION, SOLUTION INTRAMUSCULAR; INTRAVENOUS at 16:22

## 2024-12-18 RX ADMIN — ACETAMINOPHEN 975 MG: 325 TABLET, FILM COATED ORAL at 20:22

## 2024-12-18 RX ADMIN — KETOROLAC TROMETHAMINE 15 MG: 15 INJECTION, SOLUTION INTRAMUSCULAR; INTRAVENOUS at 00:48

## 2024-12-18 RX ADMIN — ACETAMINOPHEN 975 MG: 325 TABLET, FILM COATED ORAL at 13:41

## 2024-12-18 RX ADMIN — GABAPENTIN 300 MG: 300 CAPSULE ORAL at 13:41

## 2024-12-18 RX ADMIN — KETOROLAC TROMETHAMINE 15 MG: 15 INJECTION, SOLUTION INTRAMUSCULAR; INTRAVENOUS at 08:59

## 2024-12-18 RX ADMIN — ACETAMINOPHEN 975 MG: 325 TABLET, FILM COATED ORAL at 07:58

## 2024-12-18 RX ADMIN — GABAPENTIN 300 MG: 300 CAPSULE ORAL at 20:23

## 2024-12-18 RX ADMIN — DEXAMETHASONE SODIUM PHOSPHATE 4 MG: 4 INJECTION, SOLUTION INTRAMUSCULAR; INTRAVENOUS at 02:41

## 2024-12-18 RX ADMIN — DEXAMETHASONE SODIUM PHOSPHATE 4 MG: 4 INJECTION, SOLUTION INTRAMUSCULAR; INTRAVENOUS at 10:27

## 2024-12-18 RX ADMIN — GABAPENTIN 300 MG: 300 CAPSULE ORAL at 02:41

## 2024-12-18 RX ADMIN — OXYCODONE HYDROCHLORIDE 5 MG: 5 TABLET ORAL at 20:23

## 2024-12-18 RX ADMIN — OXYCODONE HYDROCHLORIDE 5 MG: 5 TABLET ORAL at 10:36

## 2024-12-18 RX ADMIN — HYDROMORPHONE HYDROCHLORIDE 1 MG: 1 INJECTION, SOLUTION INTRAMUSCULAR; INTRAVENOUS; SUBCUTANEOUS at 00:44

## 2024-12-18 RX ADMIN — OXYCODONE HYDROCHLORIDE 5 MG: 5 TABLET ORAL at 04:18

## 2024-12-18 RX ADMIN — GABAPENTIN 300 MG: 300 CAPSULE ORAL at 07:58

## 2024-12-18 RX ADMIN — DEXAMETHASONE SODIUM PHOSPHATE 4 MG: 4 INJECTION, SOLUTION INTRAMUSCULAR; INTRAVENOUS at 17:56

## 2024-12-18 ASSESSMENT — ACTIVITIES OF DAILY LIVING (ADL)
ADLS_ACUITY_SCORE: 41
ADLS_ACUITY_SCORE: 19
ADLS_ACUITY_SCORE: 41
ADLS_ACUITY_SCORE: 20
WALKING_OR_CLIMBING_STAIRS_DIFFICULTY: NO
ADLS_ACUITY_SCORE: 19
CHANGE_IN_FUNCTIONAL_STATUS_SINCE_ONSET_OF_CURRENT_ILLNESS/INJURY: YES
ADLS_ACUITY_SCORE: 20
CONCENTRATING,_REMEMBERING_OR_MAKING_DECISIONS_DIFFICULTY: NO
DIFFICULTY_EATING/SWALLOWING: NO
ADLS_ACUITY_SCORE: 20
HEARING_DIFFICULTY_OR_DEAF: NO
WEAR_GLASSES_OR_BLIND: YES
FALL_HISTORY_WITHIN_LAST_SIX_MONTHS: NO
TOILETING_ISSUES: NO
DOING_ERRANDS_INDEPENDENTLY_DIFFICULTY: NO
ADLS_ACUITY_SCORE: 19
ADLS_ACUITY_SCORE: 41
VISION_MANAGEMENT: GLASSES
ADLS_ACUITY_SCORE: 20
ADLS_ACUITY_SCORE: 20
ADLS_ACUITY_SCORE: 26
ADLS_ACUITY_SCORE: 20
ADLS_ACUITY_SCORE: 26
ADLS_ACUITY_SCORE: 20
ADLS_ACUITY_SCORE: 41
ADLS_ACUITY_SCORE: 19
ADLS_ACUITY_SCORE: 20
DIFFICULTY_COMMUNICATING: NO
ADLS_ACUITY_SCORE: 18
DRESSING/BATHING_DIFFICULTY: NO
ADLS_ACUITY_SCORE: 26

## 2024-12-18 NOTE — PHARMACY-ADMISSION MEDICATION HISTORY
Pharmacy Intern Admission Medication History    Admission medication history is complete. The information provided in this note is only as accurate as the sources available at the time of the update.    Information Source(s): Patient and CareEverywhere/SureScripts via in-person    Pertinent Information: He completed 1.5 days of the Methylprednisolone dose pack..     Changes made to PTA medication list:  Added: Tylenol, Advil, Aleve   Deleted: Clobetasol ointment   Changed: None    Allergies reviewed with patient and updates made in EHR: yes    Medication History Completed By: Yolette Kim RPH 12/18/2024 8:17 AM    PTA Med List   Medication Sig Last Dose/Taking    acetaminophen (TYLENOL) 325 MG tablet Take 325-650 mg by mouth every 6 hours as needed for mild pain. 12/17/2024 Morning    albuterol (PROAIR HFA/PROVENTIL HFA/VENTOLIN HFA) 108 (90 Base) MCG/ACT inhaler Inhale 2 puffs into the lungs every 6 hours as needed for shortness of breath, wheezing or cough. Taking As Needed    ibuprofen (ADVIL/MOTRIN) 200 MG tablet Take 200 mg by mouth every 4 hours as needed for pain. 12/17/2024 Evening    ipratropium - albuterol 0.5 mg/2.5 mg/3 mL (DUONEB) 0.5-2.5 (3) MG/3ML neb solution Take 1 vial (3 mLs) by nebulization every 6 hours as needed for shortness of breath, wheezing or cough. Taking As Needed    methylPREDNISolone (MEDROL DOSEPAK) 4 MG tablet therapy pack Follow Package Directions 12/17/2024 Evening    naproxen sodium (ANAPROX) 220 MG tablet Take 220 mg by mouth 2 times daily as needed for moderate pain. 12/17/2024 Evening

## 2024-12-18 NOTE — PLAN OF CARE
"Goal Outcome Evaluation:      Plan of Care Reviewed With: patient    Overall Patient Progress: no changeOverall Patient Progress: no change    VSS. RA. A/Ox4. 3/10 right flank and gluteal pain shooting down right leg. Numbness/tinging to right foot. Plantar and dorsiflexion strong, pedal pulse +2. 5 mg oxycodone x1. Void x1. SBA.     Problem: Adult Inpatient Plan of Care  Goal: Plan of Care Review  Description: The Plan of Care Review/Shift note should be completed every shift.  The Outcome Evaluation is a brief statement about your assessment that the patient is improving, declining, or no change.  This information will be displayed automatically on your shift  note.  Outcome: Not Progressing  Flowsheets (Taken 12/18/2024 0657)  Plan of Care Reviewed With: patient  Overall Patient Progress: no change  Goal: Patient-Specific Goal (Individualized)  Description: You can add care plan individualizations to a care plan. Examples of Individualization might be:  \"Parent requests to be called daily at 9am for status\", \"I have a hard time hearing out of my right ear\", or \"Do not touch me to wake me up as it startles  me\".  Outcome: Not Progressing  Goal: Absence of Hospital-Acquired Illness or Injury  Outcome: Not Progressing  Intervention: Identify and Manage Fall Risk  Recent Flowsheet Documentation  Taken 12/18/2024 0400 by Sammie Ordoñez, RN  Safety Promotion/Fall Prevention:   activity supervised   clutter free environment maintained   increased rounding and observation   nonskid shoes/slippers when out of bed   patient and family education   safety round/check completed   room organization consistent  Intervention: Prevent Infection  Recent Flowsheet Documentation  Taken 12/18/2024 0400 by Sammie Ordoñez, RN  Infection Prevention:   cohorting utilized   environmental surveillance performed   equipment surfaces disinfected   hand hygiene promoted   rest/sleep promoted   single patient room provided  Goal: Optimal " Comfort and Wellbeing  Outcome: Not Progressing  Goal: Readiness for Transition of Care  Outcome: Not Progressing  Intervention: Mutually Develop Transition Plan  Recent Flowsheet Documentation  Taken 12/18/2024 2051 by Sammie Ordoñez RN  Equipment Currently Used at Home: none     Problem: Pain Acute  Goal: Optimal Pain Control and Function  Outcome: Not Progressing

## 2024-12-18 NOTE — PLAN OF CARE
PRIMARY DIAGNOSIS: ACUTE PAIN  OUTPATIENT/OBSERVATION GOALS TO BE MET BEFORE DISCHARGE:  1. Pain Status: Improved but still requiring IV PRN pain meds    2. Return to near baseline physical activity: Yes    3. Cleared for discharge by consultants (if involved): No    Discharge Planner Nurse   Safe discharge environment identified: Yes  Barriers to discharge: No       Entered by: Morenita Huertas RN 12/18/2024 9:53 AM     Please review provider order for any additional goals.   Nurse to notify provider when observation goals have been met and patient is ready for discharge.

## 2024-12-18 NOTE — CONSULTS
For [facility] consults: IR consult is no longer the appropriate order   Outpatients: should be sent to the spine  referral  Iinpatients: Should be under Pain management adult IP consult (they can help the admitting team schedule an outpatient referral or procedure)

## 2024-12-18 NOTE — H&P
[facility]    History and Physical - Hospitalist Service       Date of Admission:  12/17/2024    Assessment & Plan      Skyler York is a 34 year old with no significant past medical history who came to the ER today with complaint of right lower back/gluteal pain radiating down to her foot associated with numbness and weakness and was found to have right central disc extrusion at L5-S1 abutting and displacing the transversing right S1 nerve root.     Acute sacral radiculopathy  -Patient reported on and off back pain for years however this pain is entirely different than the prior pain episodes.   -Reported that 5 days ago when he was loading his car he has had episodes of cough followed by a weird sensation in his lower back and started developing back pain.  He did back stretches, took Tylenol and Aleve but his pain only progressed.  - He came to the ER on 12/14/2024.  Since patient did not have any red flag symptoms so no advanced imaging was obtained.  Patient was discharged on Medrol Dosepak and Flexeril.  - Despite taking Medrol Dosepak and Flexeril his pain is getting worse.  It feels sharp shooting and cramping.  It is starts from his right lower back/gluteal muscle and radiates down to his foot.  Today he noticed that his toes and bottom of the foot are numb and noticed tingling sensation as well.  He also reported difficulty ambulating around.  No stool or urine incontinence.  -Upon exam he was noted to have sensations intact to gross touch, strength 5 out of 5 at ankle, knee, hip joint.   -Start patient on gabapentin 300 mg 3 times daily, monitor for sedation  - Start patient on IV dexamethasone 4 mg every 8 hours  -Tylenol 975 mg 3 times daily  - P.o. oxycodone 5 mg every 4 hours as needed  - IV Toradol 15 mg every 6 hours as needed for 2 doses  - Neurosurgery consultation  - PT/OT consultation    Leukocytosis  WBC elevated at 11.2 most likely due to recent steroid use.    Mild  "anemia  Hemoglobin is low at 12.9, mild.  Defer further evaluation outpatient         Observation Goals: -diagnostic tests and consults completed and resulted, -vital signs normal or at patient baseline, -returns to baseline functional status, Nurse to notify provider when observation goals have been met and patient is ready for discharge.  Diet: Combination Diet    DVT Prophylaxis: Ambulate every shift  Laird Catheter: Not present  Lines: None     Cardiac Monitoring: None  Code Status: Full Code      Clinically Significant Risk Factors Present on Admission                             # Overweight: Estimated body mass index is 29.48 kg/m  as calculated from the following:    Height as of this encounter: 1.778 m (5' 10\").    Weight as of this encounter: 93.2 kg (205 lb 7.5 oz).              Disposition Plan     Medically Ready for Discharge: Anticipated Tomorrow           Rocio Rosas MD  Hospitalist Service  [facility]  Securely message with [employer] (more info)  Text page via [employer] Paging/Directory     ______________________________________________________________________    Chief Complaint   Acute lower back pain    History is obtained from the patient, ER doctor, and the chart review    History of Present Illness     Skyler York is a 34 year old with no significant past medical history who came to the ER today with complaint of right lower back/gluteal pain radiating down to her foot associated with numbness and weakness and was found to have right central disc extrusion at L5-S1 abutting and displacing the transversing right S1 nerve root.     Patient reported that over the years he has had episodes of lower back pain with those pains for self-limited and usually responded very well to over-the-counter medications, stretches, and other nonpharmacological methods.  Patient said that this pain is entirely different than his prior pain episodes.  He said that on this last Friday while he was " loading his car he has had episodes of cough as he was out in the cold weather.  Immediately after that he felt weird in his back.  He did not care much and drove to [place].  His pain only continued to get worse.  He took Tylenol, Aleve but no response.  He came to the ER on 12/14/2024.  Since there were no red flag symptoms and advanced imaging was obtained and.  Patient was discharged on cyclobenzaprine and methylprednisolone Dosepak.  Patient continued taking Medrol Dosepak but his pain is not improving.  Today before coming to the hospital he noticed that he is having difficulty ambulating around and has numbness in his toes and the bottom of the foot.  He denied any urinary or stool incontinence.  In the ER imaging was obtained which revealed central disc extrusion at L5-S1 abutting and displacing the traversing right S1 nerve root.  In the ER patient was noted to have poorly controlled pain despite getting 2 mg of IV Dilaudid and Toradol.  Patient is being admitted to the observation for pain management and neurosurgical evaluation.      Past Medical History    Past Medical History:   Diagnosis Date    Abdominal bloating 10/23/2023    Allergic rhinitis     Childhood asthma     Elevated transaminase measurement 10/26/2023    Left lateral epicondylitis 04/12/2023    Nonspecific elevation of levels of transaminase and lactic acid dehydrogenase (LDH) 10/26/2023    Pain in right testicle 10/14/2020    Panic attack 02/16/2022       Past Surgical History   Past Surgical History:   Procedure Laterality Date    NO HISTORY OF SURGERY         Prior to Admission Medications   Prior to Admission Medications   Prescriptions Last Dose Informant Patient Reported? Taking?   albuterol (PROAIR HFA/PROVENTIL HFA/VENTOLIN HFA) 108 (90 Base) MCG/ACT inhaler   No No   Sig: Inhale 2 puffs into the lungs every 6 hours as needed for shortness of breath, wheezing or cough.   clobetasol (TEMOVATE) 0.05 % external ointment   No No    Sig: Apply sparingly twice a day for a week then once a day for a week then stop.   Patient not taking: Reported on 10/10/2024   cyclobenzaprine (FLEXERIL) 10 MG tablet   No No   Sig: Take 0.5-1 tablets (5-10 mg) by mouth 3 times daily as needed.   ipratropium - albuterol 0.5 mg/2.5 mg/3 mL (DUONEB) 0.5-2.5 (3) MG/3ML neb solution   No No   Sig: Take 1 vial (3 mLs) by nebulization every 6 hours as needed for shortness of breath, wheezing or cough.   methylPREDNISolone (MEDROL DOSEPAK) 4 MG tablet therapy pack   No No   Sig: Follow Package Directions      Facility-Administered Medications: None        Review of Systems    The 10 point Review of Systems is negative other than noted in the HPI or here.      Physical Exam   Vital Signs: Temp: 98.9  F (37.2  C) Temp src: Temporal BP: 132/81 Pulse: 81   Resp: 20 SpO2: 97 % O2 Device: None (Room air)    Weight: 205 lbs 7.5 oz    General Appearance: Appears calm, comfortable, not in acute distress  Eyes: Anicteric sclera  HEENT: Normocephalic, atraumatic  Respiratory: On room air, equal air entry bilaterally, wheezing in the upper lung fields  Cardiovascular: Normal rate, regular rhythm, no murmur  GI: Soft, nontender, nondistended  Musculoskeletal: No lower extremity edema noted  Neurologic: Alert and oriented x 3, sensations intact to gross touch in the bilateral lower extremities, strength 5 out of 5 in bilateral lower extremities  Psychiatric: Appropriate mood and affect    Medical Decision Making       60 MINUTES SPENT BY ME on the date of service doing chart review, history, exam, documentation & further activities per the note.      Data   ------------------------- PAST 24 HR DATA REVIEWED -----------------------------------------------

## 2024-12-18 NOTE — PLAN OF CARE
"[facility]    ED Boarding Nurse Handoff Addendum Report:    Date/time: 12/18/2024, 4:19 AM    Activity Level: standby    Fall Risk: Yes:  nonskid shoes/slippers when out of bed, patient and family education, and activity supervised    Active Infusions: None    Current Meds Due: None    Current care needs: None    Oxygen requirements (liters/min and/or FiO2): RA    Respiratory status: Room air    Vital signs (within last 30 minutes):    Vitals:    12/18/24 0135 12/18/24 0140 12/18/24 0240 12/18/24 0357   BP:   118/71 122/85   BP Location:    Right arm   Pulse:   86 65   Resp:   18 18   Temp:   98  F (36.7  C) 98.1  F (36.7  C)   TempSrc:   Oral Oral   SpO2: 97% 97% 100% 100%   Weight:    90.7 kg (200 lb)   Height:    1.778 m (5' 10\")       Focused assessment within last 30 minutes:    Patient is alert and oriented x4, pain 1/10, SBA with transfers, continent of bowel and bladder, continues to have numbness and tingling in his right foot. Consult with neuro in the morning.     ED Boarding Nurse name: Lamar Mc RN     Goal Outcome Evaluation:      Plan of Care Reviewed With: patient    Overall Patient Progress: improvingOverall Patient Progress: improving    Outcome Evaluation: Patient is alert and oriented x4, pain 1/10, SBA with transfers, continent of bowel and bladder      Problem: Adult Inpatient Plan of Care  Goal: Plan of Care Review  Description: The Plan of Care Review/Shift note should be completed every shift.  The Outcome Evaluation is a brief statement about your assessment that the patient is improving, declining, or no change.  This information will be displayed automatically on your shift  note.  Outcome: Progressing  Flowsheets (Taken 12/18/2024 0418)  Outcome Evaluation: Patient is alert and oriented x4, pain 1/10, SBA with transfers, continent of bowel and bladder  Plan of Care Reviewed With: patient  Overall Patient Progress: improving  Goal: Patient-Specific Goal " "(Individualized)  Description: You can add care plan individualizations to a care plan. Examples of Individualization might be:  \"Parent requests to be called daily at 9am for status\", \"I have a hard time hearing out of my right ear\", or \"Do not touch me to wake me up as it startles  me\".  Outcome: Progressing  Goal: Absence of Hospital-Acquired Illness or Injury  Outcome: Progressing  Goal: Optimal Comfort and Wellbeing  Outcome: Progressing  Goal: Readiness for Transition of Care  Outcome: Progressing     "

## 2024-12-18 NOTE — ED PROVIDER NOTES
"  Emergency Department Note      History of Present Illness     Chief Complaint   Back Pain      HPI   Skyler York is a 34 year old male who presents to the ED with his wife for evaluation of back pain. The patient states he coughed 4 days ago, felt a \"weird\" sensation in his lower back, and started to develop back pain that radiates down his right leg. States the pain has been worsening with time and is exacerbated by standing or sitting for long periods of time. Describes the pain as a \"knot\" in his right lower back with sitting and 10/10 in severity with standing. He was seen here 2 days ago for these symptoms and was prescribed flexeril and a medrol dosepak which he has been taking as directed. Since then, he has developed right foot numbness and weakness. He is now requiring assistance with ambulation, using a walker here and unable to sit down. States he has not been getting relief from his prescription medications as well as Tylenol, ibuprofen, or Aleve. Reports a history of similar symptoms many years ago but not as severe that he was able to resolve with physical therapy. He did attempt those same physical therapy exercises at home with his current symptoms without relief. Denies left-sided symptoms. No bowel or bladder incontinence, urinary retention, or saddle anesthesia. Patient is not on blood thinners..     Independent Historian   None    Review of External Notes   I reviewed ED records from 12/14/2024.  Patient was prescribed Flexeril, as well as Medrol Dosepak.    Past Medical History     Medical History and Problem List   Panic attack  Asthma     Medications   Albuterol  Flexeril  Duoneb  Medrol dosepak     Physical Exam     Patient Vitals for the past 24 hrs:   BP Temp Temp src Pulse Resp SpO2 Height Weight   12/17/24 1931 133/84 98.9  F (37.2  C) Temporal 81 20 100 % 1.778 m (5' 10\") 93.2 kg (205 lb 7.5 oz)     Physical Exam  General: walking around the room using a walker, unable to sit " down.  HENT: mucous membranes dry  Resp: normal effort  GI: abdomen soft and nontender, no guarding  MSK: Tenderness to the lumbar spine and right SI joint.  Tenderness also to the right buttock.  Skin: appropriately warm and dry  Extremities: no edema, calves non-tender  Neuro: alert, clear speech, oriented.  Patient able to sit down but very uncomfortable when doing so, 5 out of 5 strength dorsiflexion, plantarflexion, knee extension, knee flexion, hip flexion bilaterally.  Fine touch sensation grossly intact in bilateral thighs and lower legs.  Decreased fine touch sensation to the dorsum of the right foot.  Psych: normal mood and affect      Diagnostics     Lab Results   Labs Ordered and Resulted from Time of ED Arrival to Time of ED Departure - No data to display    Imaging   Lumbar spine MRI w/o contrast   Final Result   IMPRESSION:   1.  Right central disc extrusion at L5-S1 abuts and displaces the traversing right S1 nerve root and is the likely source of the patient's symptoms.        Independent Interpretation   None    ED Course      Medications Administered   Dilaudid 1 mg  Dilaudid 1 mg  Methylprednisolone 40 mg IV    Procedures   Procedures     Discussion of Management   Admitting Hospitalist, Dr. Rosas    ED Course   ED Course as of 12/17/24 2120   Tue Dec 17, 2024   2110 I obtained history and performed a physical exam as noted above.        Additional Documentation  None    Medical Decision Making / Diagnosis     CMS Diagnoses: None    MIPS       None    MDM   Skyler York is a 34 year old male with previous history of sciatica, presenting today with exacerbation of right-sided back pain radiating into the right leg.  Clinical presentation is consistent with right-sided sciatica.  Given progression of symptoms and spite of maximal outpatient medical therapy, lumbar spine imaging was obtained.  This confirms diagnosis of L5-S1 right-sided nerve root compression.  On my exam, the patient has  decreased sensation to the right foot, but strength seems to be intact.  No symptoms to suggest cauda equina.  He continues to be in severe pain and spite of Dilaudid, therefore, we will plan to admit for pain control and spine specialist evaluation.    Disposition   The patient was admitted to the hospital.     Diagnosis     ICD-10-CM    1. Lumbar disc herniation  M51.26            Scribe Disclosure:  Beryl VICK, am serving as a scribe at 9:06 PM on 12/17/2024 to document services personally performed by Karma Burciaga MD based on my observations and the provider's statements to me.        Karma Burciaga MD  12/18/24 [id number]

## 2024-12-18 NOTE — PLAN OF CARE
Occupational Therapy: Orders received. Chart reviewed and discussed with care team.? Occupational Therapy not indicated due to pt with no occupational therapy needs at this time, independent with all A/IADLs and mobility.? Defer discharge recommendations to evaluating PT and medical team.? Will complete orders.

## 2024-12-18 NOTE — PROGRESS NOTES
Please see admission H&P from my colleague Dr. Rosas earlier today    The patient presents with RLE numbness and tingling involving the patient's R ft for the past week    No injury/trauma reported    MRI shows R central disc extrusion at L5-S1 displacing the R S1 nerve root    Pt able to stand on toes and balance on heels.  Normal knee flexion and extension strength of BLE    Plans today:  - neurosurgery consult  - PT consult  - symptom management  - possible discharge later today vs tomorrow pending neurosurg recommendations

## 2024-12-18 NOTE — PLAN OF CARE
Awaiting PT and neuro consults. Pain well controlled. Numbness to R lateral foot persists. Will continue with plan of care.  PRIMARY DIAGNOSIS: ACUTE PAIN  OUTPATIENT/OBSERVATION GOALS TO BE MET BEFORE DISCHARGE:  1. Pain Status: Improved but still requiring IV PRN pain medications    2. Return to near baseline physical activity: Yes    3. Cleared for discharge by consultants (if involved): No    Discharge Planner Nurse   Safe discharge environment identified: Yes  Barriers to discharge: No       Entered by: Morenita Huertas RN 12/18/2024 12:28 PM     Please review provider order for any additional goals.   Nurse to notify provider when observation goals have been met and patient is ready for discharge.

## 2024-12-18 NOTE — ED TRIAGE NOTES
Right sided Butt to toes sharp, aching, spasm pain radiating down.  States numbness in right toes.  Started Friday, seen here on Sun, worse since Sunday.  Denies loss of bowel or bladder.      Triage Assessment (Adult)       Row Name 12/17/24 1929          Triage Assessment    Airway WDL WDL        Respiratory WDL    Respiratory WDL WDL        Skin Circulation/Temperature WDL    Skin Circulation/Temperature WDL WDL        Cardiac WDL    Cardiac WDL WDL                      29 y/o female, currently 8 weeks pregnant,  with 1st trimester bleeding and cramping. Will rule-out miscarriage, has confirmed IUP, labs, transvaginal US, UA, and u-culture. Pt declining pain medications.

## 2024-12-18 NOTE — PLAN OF CARE
PT: Orders received. Chart reviewed and discussed with care team. Discussed with patient who has good understanding of management of symptoms at this time with stretching and positioning, acute on chronic LBP. Demonstrated ind with mobility, with no IP rehab needs identified. Currently is planned to follow up with OP PT.  Will complete orders.

## 2024-12-18 NOTE — ED NOTES
"[facility]  ED Nurse Handoff Report    ED Chief complaint: Back Pain  . ED Diagnosis:   Final diagnoses:   None       Allergies:   Allergies   Allergen Reactions    No Known Allergies        Code Status: Full Code    Activity level - Baseline/Home:  independent.  Activity Level - Current:   assist of 1.   Lift room needed: No.   Bariatric: No   Needed: No   Isolation: No.   Infection: Not Applicable.     Respiratory status: Room air    Vital Signs (within 30 minutes):   Vitals:    12/17/24 1931   BP: 133/84   Pulse: 81   Resp: 20   Temp: 98.9  F (37.2  C)   TempSrc: Temporal   SpO2: 100%   Weight: 93.2 kg (205 lb 7.5 oz)   Height: 1.778 m (5' 10\")       Cardiac Rhythm:  ,      Pain level:    Patient confused: No.   Patient Falls Risk:    .   Elimination Status: Has voided     Patient Report - Initial Complaint:        Emergency Department Note       History of Present Illness      Chief Complaint   Back Pain        HPI Per provider note: Skyler York is a 34 year old male who presents to the ED with his wife for evaluation of back pain. The patient states he coughed 4 days ago, felt a \"weird\" sensation in his lower back, and started to develop back pain that radiates down his right leg. States the pain has been worsening with time and is exacerbated by standing or sitting for long periods of time. Describes the pain as a \"knot\" in his right lower back with sitting and 10/10 in severity with standing. He was seen here 2 days ago for these symptoms and was prescribed flexeril and a medrol dosepak which he has been taking as directed. Since then, he has developed right foot numbness and weakness. He is now requiring assistance with ambulation, using a walker here and unable to sit down. States he has not been getting relief from his prescription medications as well as Tylenol, ibuprofen, or Aleve. Reports a history of similar symptoms many years ago but not as severe that he was able " to resolve with physical therapy. He did attempt those same physical therapy exercises at home with his current symptoms without relief. Denies left-sided symptoms. No bowel or bladder incontinence, urinary retention, or saddle anesthesia. Patient is not on blood thinners..       .   Focused Assessment:      Abnormal Results:   Labs Ordered and Resulted from Time of ED Arrival to Time of ED Departure   BASIC METABOLIC PANEL - Abnormal       Result Value    Sodium 139      Potassium 3.4      Chloride 101      Carbon Dioxide (CO2) 26      Anion Gap 12      Urea Nitrogen 17.1      Creatinine 1.18 (*)     GFR Estimate 83      Calcium 8.9      Glucose 104 (*)    CBC WITH PLATELETS AND DIFFERENTIAL - Abnormal    WBC Count 11.2 (*)     RBC Count 4.62      Hemoglobin 12.9 (*)     Hematocrit 39.8 (*)     MCV 86      MCH 27.9      MCHC 32.4      RDW 12.4      Platelet Count 252      % Neutrophils 62      % Lymphocytes 26      % Monocytes 10      % Eosinophils 1      % Basophils 0      % Immature Granulocytes 0      NRBCs per 100 WBC 0      Absolute Neutrophils 7.0      Absolute Lymphocytes 2.9      Absolute Monocytes 1.2      Absolute Eosinophils 0.1      Absolute Basophils 0.0      Absolute Immature Granulocytes 0.0      Absolute NRBCs 0.0          Lumbar spine MRI w/o contrast   Final Result   IMPRESSION:   1.  Right central disc extrusion at L5-S1 abuts and displaces the traversing right S1 nerve root and is the likely source of the patient's symptoms.          Treatments provided:   Family Comments: wife at bedside  OBS brochure/video discussed/provided to patient:  No  ED Medications:   Medications   HYDROmorphone (DILAUDID) injection 1 mg (1 mg Intravenous $Given 12/17/24 2139)   HYDROmorphone (DILAUDID) injection 1 mg (1 mg Intravenous $Given 12/18/24 0044)   ketorolac (TORADOL) injection 15 mg (15 mg Intravenous $Given 12/18/24 0048)       Drips infusing:  No  For the majority of the shift this patient was Green.    Interventions performed were .    Sepsis treatment initiated: No    Cares/treatment/interventions/medications to be completed following ED care:     ED Nurse Name: Ceferino Maldonado RN  1:38 AM   RECEIVING UNIT ED HANDOFF REVIEW    Above ED Nurse Handoff Report was reviewed: Yes  Reviewed by: Sammie Ordoñez RN on December 18, 2024 at 3:43 AM   I Colten called the ED to inform them the note was read: Yes

## 2024-12-19 VITALS
OXYGEN SATURATION: 100 % | DIASTOLIC BLOOD PRESSURE: 76 MMHG | SYSTOLIC BLOOD PRESSURE: 124 MMHG | RESPIRATION RATE: 16 BRPM | BODY MASS INDEX: 28.63 KG/M2 | TEMPERATURE: 97.8 F | WEIGHT: 200 LBS | HEART RATE: 77 BPM | HEIGHT: 70 IN

## 2024-12-19 PROCEDURE — 250N000011 HC RX IP 250 OP 636: Performed by: STUDENT IN AN ORGANIZED HEALTH CARE EDUCATION/TRAINING PROGRAM

## 2024-12-19 PROCEDURE — 250N000013 HC RX MED GY IP 250 OP 250 PS 637: Performed by: NURSE PRACTITIONER

## 2024-12-19 PROCEDURE — 250N000013 HC RX MED GY IP 250 OP 250 PS 637: Performed by: STUDENT IN AN ORGANIZED HEALTH CARE EDUCATION/TRAINING PROGRAM

## 2024-12-19 PROCEDURE — 96376 TX/PRO/DX INJ SAME DRUG ADON: CPT

## 2024-12-19 PROCEDURE — 99222 1ST HOSP IP/OBS MODERATE 55: CPT | Performed by: NURSE PRACTITIONER

## 2024-12-19 PROCEDURE — 99239 HOSP IP/OBS DSCHRG MGMT >30: CPT | Performed by: INTERNAL MEDICINE

## 2024-12-19 PROCEDURE — 99207 PR APP CREDIT; MD BILLING SHARED VISIT: CPT | Performed by: INTERNAL MEDICINE

## 2024-12-19 RX ORDER — GABAPENTIN 400 MG/1
400 CAPSULE ORAL 3 TIMES DAILY
Status: DISCONTINUED | OUTPATIENT
Start: 2024-12-19 | End: 2024-12-19 | Stop reason: HOSPADM

## 2024-12-19 RX ORDER — METHOCARBAMOL 500 MG/1
500 TABLET, FILM COATED ORAL 3 TIMES DAILY PRN
Qty: 60 TABLET | Refills: 0 | Status: SHIPPED | OUTPATIENT
Start: 2024-12-19

## 2024-12-19 RX ORDER — OXYCODONE HYDROCHLORIDE 5 MG/1
5 TABLET ORAL EVERY 4 HOURS PRN
Qty: 12 TABLET | Refills: 0 | Status: SHIPPED | OUTPATIENT
Start: 2024-12-19

## 2024-12-19 RX ORDER — AMOXICILLIN 250 MG
1 CAPSULE ORAL 2 TIMES DAILY PRN
Qty: 30 TABLET | Refills: 0 | Status: SHIPPED | OUTPATIENT
Start: 2024-12-19

## 2024-12-19 RX ORDER — GABAPENTIN 400 MG/1
400 CAPSULE ORAL 3 TIMES DAILY
Qty: 90 CAPSULE | Refills: 0 | Status: SHIPPED | OUTPATIENT
Start: 2024-12-19

## 2024-12-19 RX ORDER — POLYETHYLENE GLYCOL 3350 17 G/17G
17 POWDER, FOR SOLUTION ORAL DAILY PRN
Qty: 15 PACKET | Refills: 0 | Status: SHIPPED | OUTPATIENT
Start: 2024-12-19

## 2024-12-19 RX ORDER — POLYETHYLENE GLYCOL 3350 17 G/17G
17 POWDER, FOR SOLUTION ORAL DAILY PRN
Status: DISCONTINUED | OUTPATIENT
Start: 2024-12-19 | End: 2024-12-19 | Stop reason: HOSPADM

## 2024-12-19 RX ORDER — GABAPENTIN 100 MG/1
100 CAPSULE ORAL ONCE
Status: COMPLETED | OUTPATIENT
Start: 2024-12-19 | End: 2024-12-19

## 2024-12-19 RX ORDER — METHOCARBAMOL 500 MG/1
500 TABLET, FILM COATED ORAL 3 TIMES DAILY PRN
Status: DISCONTINUED | OUTPATIENT
Start: 2024-12-19 | End: 2024-12-19 | Stop reason: HOSPADM

## 2024-12-19 RX ADMIN — DEXAMETHASONE SODIUM PHOSPHATE 4 MG: 4 INJECTION, SOLUTION INTRAMUSCULAR; INTRAVENOUS at 11:00

## 2024-12-19 RX ADMIN — OXYCODONE HYDROCHLORIDE 5 MG: 5 TABLET ORAL at 07:51

## 2024-12-19 RX ADMIN — DEXAMETHASONE SODIUM PHOSPHATE 4 MG: 4 INJECTION, SOLUTION INTRAMUSCULAR; INTRAVENOUS at 02:53

## 2024-12-19 RX ADMIN — OXYCODONE HYDROCHLORIDE 5 MG: 5 TABLET ORAL at 12:00

## 2024-12-19 RX ADMIN — METHOCARBAMOL 500 MG: 500 TABLET ORAL at 11:06

## 2024-12-19 RX ADMIN — GABAPENTIN 100 MG: 100 CAPSULE ORAL at 11:12

## 2024-12-19 RX ADMIN — ACETAMINOPHEN 975 MG: 325 TABLET, FILM COATED ORAL at 07:48

## 2024-12-19 RX ADMIN — ACETAMINOPHEN 975 MG: 325 TABLET, FILM COATED ORAL at 14:52

## 2024-12-19 RX ADMIN — GABAPENTIN 400 MG: 400 CAPSULE ORAL at 14:53

## 2024-12-19 RX ADMIN — GABAPENTIN 300 MG: 300 CAPSULE ORAL at 07:48

## 2024-12-19 ASSESSMENT — ACTIVITIES OF DAILY LIVING (ADL)
ADLS_ACUITY_SCORE: 26

## 2024-12-19 NOTE — CONSULTS
[facility] ACUTE PAIN SERVICE CONSULTATION   [facility]   Colten Rubio    Date of Admission:  12/17/2024  Date of Consult (When I saw the patient): 12/19/24     Assessment/Plan:   Assessment/Plan:  Skyler York is a 34 year old male who was admitted on 12/17/2024.  Pain team was asked to see the patient for pain management related to right lumbar radiculopathy and L5 ask 1 disc bulge with S1 nerve root impingement.  We have been asked to place an outpatient pain interventional consult for consideration of an epidural steroid injection. Admitted for pain and inability to walk. History of lumbar radiculopathy several years ago which resolved with PT and chiropractic care, nonspecific transaminase and lactic acid dehydrogenase levels, abdominal bloating, left lateral epicondylitis, panic attacks, right testicular pain. The patient does not smoke and no chemical dependency history.   [place]  reveals no controlled substances within the past 12 months.  Prior to admission he had 1.5 days of a Medrol Dosepak and continued dexamethasone here while inpatient.  He reports improved symptoms but occasionally has spasms in the left buttocks and leg.  He is able to ambulate independently.      Opioid Induced Respiratory Depression Risk Assessment:?  (Low 0-1; Moderate 2-4; or High >4 or >/= 3 if two of the risk factors are age > 60 and opioid naive) due to the following risk factors: SALOMÓN, COPD/Asthma/pulmonary disease, CHF, renal dysfunction, hepatic dysfunction, Obesity, Smoker, Age>60, >2 opioid therapies, concomitant CNS depressants, opioid naive status, or post surgical.    CrCl 99.9 mg/mL  The patient's home MME was 0 mg daily. In the last 24 hours, patient has utilized 5 mg for 3 doses of PO oxycodone, IV 0 mg, for an MME 22.5 mg (yesterday MME was -- mg).    PLAN:   1) Pain is consistent with musculoskeletal and neuropathic in the setting of L5-S1 right sided disc bulge  and S1 nerve root impingement.   Multimodal Medication Therapy  Topical: He has not found this helpful  NSAID'S: Continue ketorolac IV while inpatient.  Would not resume NSAIDs while taking Medrol Dosepak as outpatient  Steroids: Continue IV dexamethasone every 8 hours  Muscle Relaxants: Methocarbamol 500 mg 3 times daily as needed  Adjuvants: Increase gabapentin to 400 mg 3 times daily, acetaminophen scheduled  Antidepressants/anxiolytics: None  Opioids: Oxycodone 5 mg every 4 hours as needed  IV Pain medication: No longer indicated  Non-medication interventions:  Ice, Rest, PT, OT, and pacing activity  Constipation Prophylaxis: Scheduled and prn: Senna-docusate and Miralax    -Opioid prescriber has been none.       Discharge Recommendations - We recommend prescribing the following at the time of discharge:   Gabapentin 400 mg 3 times daily  Methocarbamol 500 mg every 8 hours as needed  Oxycodone 5 mg every 4 hours as needed #12 tabs  Resume Medrol Dosepak  Intranasal naloxone not recommended.  Follow up outpatient pain management referral for consideration of an epidural steroid injection ordered today  Physical therapy to start per patient 12/23/2024.     Acute pain service will sign off.   Disposition: Home  Prescribing at discharge: Hospital medicine         History of Present Illness (HPI):       Skyler York is a 34 year old  male with a past medical history noted above and presents with acute onset of lumbar right radiculopathy.  The patient reports pain that is located  in low back and radiates to right leg in an S1 distribution pattern.  Alleviating factors include pacing, medications, rest and exacerbates include medications wearing of.f prolonged standing or standing.  Current pain is rated at 4/10 and goal is 4/10. The patient has  no opioid tolerance. Opioid induced side effects including sedation , respiratory suppression, nausea , and constipation are not noted.  He denies dizzinessm  "lightheadedness shortness of breath, chest pain, changes in mental status, changes in urination or bowel pattern    Discussed multimodal interventions as well as other than systemic pharmacologic treatments for acute and chronic pain .    Review of medical record/Summary of labs and care everywhere as part of comprehensive review.      Past pain treatments have include chiropractic, physical therapy, medications    UDS No results found for: \"UAMP\", \"UBARB\", \"UCANN\", \"UCOC\", \"OPIT\", \"UPCP\"        Medical History   has a past medical history of Abdominal bloating (10/23/2023), Allergic rhinitis, Childhood asthma, Elevated transaminase measurement (10/26/2023), Left lateral epicondylitis (04/12/2023), Nonspecific elevation of levels of transaminase and lactic acid dehydrogenase (LDH) (10/26/2023), Pain in right testicle (10/14/2020), and Panic attack (02/16/2022).    He has no past medical history of Cancer (H), Cerebral infarction (H), Congestive heart failure (H), COPD (chronic obstructive pulmonary disease) (H), Depressive disorder, Diabetes (H), Heart disease, History of blood transfusion, Hypertension, or Thyroid disease.       Surgical History   has a past surgical history that includes no history of surgery.     Allergies     Allergies   Allergen Reactions    No Known Allergies         Current Home Medications   Prior to Admission medications    Medication Sig Start Date End Date Taking? Authorizing Provider   acetaminophen (TYLENOL) 325 MG tablet Take 325-650 mg by mouth every 6 hours as needed for mild pain.   Yes Unknown, Entered By History   albuterol (PROAIR HFA/PROVENTIL HFA/VENTOLIN HFA) 108 (90 Base) MCG/ACT inhaler Inhale 2 puffs into the lungs every 6 hours as needed for shortness of breath, wheezing or cough. 10/10/24  Yes Renee Harris PA-C   ibuprofen (ADVIL/MOTRIN) 200 MG tablet Take 200 mg by mouth every 4 hours as needed for pain.   Yes Unknown, Entered By History   ipratropium - albuterol " 0.5 mg/2.5 mg/3 mL (DUONEB) 0.5-2.5 (3) MG/3ML neb solution Take 1 vial (3 mLs) by nebulization every 6 hours as needed for shortness of breath, wheezing or cough. 10/10/24  Yes Renee Harris PA-C   methylPREDNISolone (MEDROL DOSEPAK) 4 MG tablet therapy pack Follow Package Directions 12/15/24  Yes John Villarreal MD   naproxen sodium (ANAPROX) 220 MG tablet Take 220 mg by mouth 2 times daily as needed for moderate pain.   Yes Unknown, Entered By History   cyclobenzaprine (FLEXERIL) 10 MG tablet Take 0.5-1 tablets (5-10 mg) by mouth 3 times daily as needed. 12/15/24   John Villarreal MD          Social History  Reviewed, and he  reports that he has never smoked. He has never been exposed to tobacco smoke. He has never used smokeless tobacco. He reports that he does not drink alcohol and does not use drugs.      Family History- Reviewed care everywhere to find family history  Nothing relevant to pain consult    Reviewed, and family history includes Anxiety Disorder in his sister; Breast Cancer in his paternal aunt; Colon Cancer in his father; Diabetes in his paternal grandmother and paternal uncle; Hypertension in his mother and paternal uncle; No Known Problems in his maternal grandfather, maternal grandmother, and paternal grandfather.    Review of Systems  Complete ROS reviewed, unless noted  , all other systems reviewed (with patient) and all others found to be negative.         Objective:     Vitals:  B/P: 124/76, T: 97.8, P: 77, R: 16     Weight:   200 lbs 0 oz  Body mass index is 28.7 kg/m .      Physical Exam:     General Appearance:  Alert, cooperative, no distress, appears stated age, grooming good  Patient is pleasant.   Head:  Normocephalic, without obvious abnormality, atraumatic   Eyes:  Pupils are midposition normal size conjunctivae clear, no drainage.  EOMIs intact   ENT/Throat: Lips and mouth are moist   Lymph/Neck: Supple, symmetrical, trachea midline, no adenopathy, thyroid: not enlarged, symmetric     Lungs:   Clear to auscultation bilaterally, respirations unlabored   Chest Wall:  No tenderness or deformity   Cardiovascular/Heart:  Regular rate and rhythm, S1, S2 no edema   Abdomen:   Soft, non-tender, bowel sounds active all four quadrants,  no masses, no organomegaly   Musculoskeletal: Extremities normal, atraumatic  Incision none   Skin: Skin color good, lesions none   Neurologic  Affect: Alert and oriented X 3, Moves all 4 extremities  Euthymic, abberant pain behaviors No          Imaging Reviewed Personally By Myself     Results for orders placed or performed during the hospital encounter of 12/17/24   Lumbar spine MRI w/o contrast    Impression    IMPRESSION:  1.  Right central disc extrusion at L5-S1 abuts and displaces the traversing right S1 nerve root and is the likely source of the patient's symptoms.        Labs Reviewed Personally By Myself    Sodium   Date Value Ref Range Status   12/17/2024 139 135 - 145 mmol/L Final   04/25/2021 138 133 - 144 mmol/L Final     Potassium   Date Value Ref Range Status   12/17/2024 3.4 3.4 - 5.3 mmol/L Final   02/16/2022 4.1 3.4 - 5.3 mmol/L Final   04/25/2021 3.8 3.4 - 5.3 mmol/L Final     Chloride   Date Value Ref Range Status   12/17/2024 101 98 - 107 mmol/L Final   02/16/2022 105 94 - 109 mmol/L Final   04/25/2021 106 94 - 109 mmol/L Final     Carbon Dioxide   Date Value Ref Range Status   04/25/2021 30 20 - 32 mmol/L Final     Carbon Dioxide (CO2)   Date Value Ref Range Status   12/17/2024 26 22 - 29 mmol/L Final   02/16/2022 25 20 - 32 mmol/L Final     Anion Gap   Date Value Ref Range Status   12/17/2024 12 7 - 15 mmol/L Final   02/16/2022 8 3 - 14 mmol/L Final   04/25/2021 2 (L) 3 - 14 mmol/L Final     Glucose   Date Value Ref Range Status   12/17/2024 104 (H) 70 - 99 mg/dL Final   02/16/2022 86 70 - 99 mg/dL Final   04/25/2021 103 (H) 70 - 99 mg/dL Final     Urea Nitrogen   Date Value Ref Range Status   12/17/2024 17.1 6.0 - 20.0 mg/dL Final   02/16/2022 17  7 - 30 mg/dL Final   04/25/2021 14 7 - 30 mg/dL Final     Creatinine   Date Value Ref Range Status   12/17/2024 1.18 (H) 0.67 - 1.17 mg/dL Final   04/25/2021 1.13 0.66 - 1.25 mg/dL Final     GFR Estimate   Date Value Ref Range Status   12/17/2024 83 >60 mL/min/1.73m2 Final     Comment:     eGFR calculated using 2021 CKD-EPI equation.   04/25/2021 86 >60 mL/min/[1.73_m2] Final     Comment:     Non  GFR Calc  Starting 12/18/2018, serum creatinine based estimated GFR (eGFR) will be   calculated using the Chronic Kidney Disease Epidemiology Collaboration   (CKD-EPI) equation.       Calcium   Date Value Ref Range Status   12/17/2024 8.9 8.8 - 10.4 mg/dL Final     Comment:     Reference intervals for this test were updated on 7/16/2024 to reflect our healthy population more accurately. There may be differences in the flagging of prior results with similar values performed with this method. Those prior results can be interpreted in the context of the updated reference intervals.   04/25/2021 9.0 8.5 - 10.1 mg/dL Final       Labs Imaging and Notes Reviewed : Reviewed I have personally reviewed pertinent notes, labs, tests, and radiologic  report and imaging in patient's chart. yes    Total time spent 50 minutes with greater than 50% in consultation, education and coordination of care.   Also discussed with RN and Hospital Medicine Service.   I discussed and educated the pain plan with the patient regarding:  multimodal pain approach, medications as listed above, reviewed discharge medications, educated on tapering off as pain improves, watch for constipation and stool softeners, no driving or alcohol with opioids, store medications in a safe place, and advised to take no more than the prescribed dose as increased doses may cause respiratory depression or death.  Treatment plan includes: multimodal pain approach, Hospital Medicine Service for medical management.   Elements of Medical Decision Making as  described above. High level of decision making required due to 1 or more chronic illness with severe exacerbation, progression, and side effects of treatment. Acute or chronic illness or injury or surgery. Illness that poses a threat to life or bodily function. High risk therapy including opioids, high risk drug therapy including oral and/or parenteral controlled substances.    Please see A&P for additional details of medical decision making.    Patient is understanding of the plan. All questions and concerns addressed to patient's satisfaction.       Joanne Reid, APRN,CNP, ACHPN, PGMT-BC   Acute Pain Team ( [facility], [place], [facility]) 8-4:30 after 3:30 contact primary team   No weekend coverage   Securely message with the Vocera Web Console (learn more here)

## 2024-12-19 NOTE — PLAN OF CARE
"Goal Outcome Evaluation:      Plan of Care Reviewed With: patient    Overall Patient Progress: no change            VS: Afebrile. VSS.  Respiratory: Lung sounds clear. Infrequent, non-productive cough.   Peripheral Neurovascular: Pt reports numbness on right side of the R foot. Pedal pulses 2+.   GI: WDL.   : Voiding.   Activity: Independent.   Pain: Rating pain 3-4. Scheduled Tylenol and Gabapentin given along with PRN Oxy x1.   Lines: R PIV SL.   Plan: Pain management. Monitor vital signs. IV Decadron.             Problem: Adult Inpatient Plan of Care  Goal: Plan of Care Review  Description: The Plan of Care Review/Shift note should be completed every shift.  The Outcome Evaluation is a brief statement about your assessment that the patient is improving, declining, or no change.  This information will be displayed automatically on your shift  note.  Outcome: Progressing  Flowsheets (Taken 12/19/2024 0124)  Plan of Care Reviewed With: patient  Overall Patient Progress: no change  Goal: Patient-Specific Goal (Individualized)  Description: You can add care plan individualizations to a care plan. Examples of Individualization might be:  \"Parent requests to be called daily at 9am for status\", \"I have a hard time hearing out of my right ear\", or \"Do not touch me to wake me up as it startles  me\".  Outcome: Progressing  Goal: Absence of Hospital-Acquired Illness or Injury  Outcome: Progressing  Intervention: Identify and Manage Fall Risk  Recent Flowsheet Documentation  Taken 12/18/2024 2022 by uLz Ugalde RN  Safety Promotion/Fall Prevention:   clutter free environment maintained   patient and family education   room near nurse's station   room organization consistent   safety round/check completed   assistive device/personal items within reach   lighting adjusted   nonskid shoes/slippers when out of bed  Intervention: Prevent Skin Injury  Recent Flowsheet Documentation  Taken 12/18/2024 2022 by Luz Ugalde, " RN  Body Position: position changed independently  Skin Protection: adhesive use limited  Intervention: Prevent and Manage VTE (Venous Thromboembolism) Risk  Recent Flowsheet Documentation  Taken 12/18/2024 2022 by Luz Ugalde RN  VTE Prevention/Management: (Up independently) SCDs off (sequential compression devices)  Intervention: Prevent Infection  Recent Flowsheet Documentation  Taken 12/18/2024 2022 by Luz Ugalde RN  Infection Prevention:   equipment surfaces disinfected   hand hygiene promoted   rest/sleep promoted   single patient room provided   environmental surveillance performed   personal protective equipment utilized  Goal: Optimal Comfort and Wellbeing  Outcome: Progressing  Intervention: Monitor Pain and Promote Comfort  Recent Flowsheet Documentation  Taken 12/18/2024 2022 by Luz Ugalde RN  Pain Management Interventions:   medication (see MAR)   care clustered   distraction   emotional support   quiet environment facilitated   rest   repositioned   shower  Goal: Readiness for Transition of Care  Outcome: Progressing     Problem: Pain Acute  Goal: Optimal Pain Control and Function  Outcome: Progressing  Intervention: Develop Pain Management Plan  Recent Flowsheet Documentation  Taken 12/18/2024 2022 by Luz Ugalde RN  Pain Management Interventions:   medication (see MAR)   care clustered   distraction   emotional support   quiet environment facilitated   rest   repositioned   shower  Intervention: Prevent or Manage Pain  Recent Flowsheet Documentation  Taken 12/18/2024 2022 by Luz Ugalde RN  Sensory Stimulation Regulation:   care clustered   lighting decreased   quiet environment promoted  Sleep/Rest Enhancement:   consistent schedule promoted   regular sleep/rest pattern promoted   awakenings minimized   comfort measures   room darkened  Medication Review/Management:   medications reviewed   high-risk medications identified

## 2024-12-19 NOTE — PLAN OF CARE
"Goal Outcome Evaluation: [id number]    Vitals: VSS. Afebrile. Independent in room. Pt rested comfortably between cares.   Resp: WDL.  LS clear and equal. cough  GI/: WDL. Denies N/V. Tolerating regular diet. Voiding WDL.  IV: WDL dressing c/d/i, Saline locked  Pain/Comfort: 3/10. Toradol x1  Skin: WDL. Right foot numbness. Slightly improving per pt  Plan: continue IV steroids. Pain management       Plan of Care Reviewed With: patient    Overall Patient Progress: no changeOverall Patient Progress: no change         Problem: Adult Inpatient Plan of Care  Goal: Plan of Care Review  Description: The Plan of Care Review/Shift note should be completed every shift.  The Outcome Evaluation is a brief statement about your assessment that the patient is improving, declining, or no change.  This information will be displayed automatically on your shift  note.  Outcome: Progressing  Flowsheets (Taken 12/18/2024 1805)  Plan of Care Reviewed With: patient  Overall Patient Progress: no change  Goal: Patient-Specific Goal (Individualized)  Description: You can add care plan individualizations to a care plan. Examples of Individualization might be:  \"Parent requests to be called daily at 9am for status\", \"I have a hard time hearing out of my right ear\", or \"Do not touch me to wake me up as it startles  me\".  Outcome: Progressing  Goal: Absence of Hospital-Acquired Illness or Injury  Outcome: Progressing  Intervention: Identify and Manage Fall Risk  Recent Flowsheet Documentation  Taken 12/18/2024 1620 by Dinora Jaimes, RN  Safety Promotion/Fall Prevention:   clutter free environment maintained   patient and family education   room near nurse's station   room organization consistent   safety round/check completed  Intervention: Prevent Skin Injury  Recent Flowsheet Documentation  Taken 12/18/2024 1620 by Dinora Jaimes, RN  Body Position: position changed independently  Skin Protection: adhesive use limited  Intervention: " Prevent Infection  Recent Flowsheet Documentation  Taken 12/18/2024 1620 by Dinora Jaimes RN  Infection Prevention:   equipment surfaces disinfected   hand hygiene promoted   rest/sleep promoted   single patient room provided  Goal: Optimal Comfort and Wellbeing  Outcome: Progressing  Intervention: Monitor Pain and Promote Comfort  Recent Flowsheet Documentation  Taken 12/18/2024 1620 by Dinora Jaimes, RN  Pain Management Interventions: medication (see MAR)  Goal: Readiness for Transition of Care  Outcome: Progressing     Problem: Pain Acute  Goal: Optimal Pain Control and Function  Outcome: Progressing  Intervention: Develop Pain Management Plan  Recent Flowsheet Documentation  Taken 12/18/2024 1620 by Dinora Jaimes RN  Pain Management Interventions: medication (see MAR)  Intervention: Prevent or Manage Pain  Recent Flowsheet Documentation  Taken 12/18/2024 1620 by Dinora Jaimes, RN  Sleep/Rest Enhancement:   consistent schedule promoted   regular sleep/rest pattern promoted  Medication Review/Management:   medications reviewed   high-risk medications identified

## 2024-12-19 NOTE — PROGRESS NOTES
"    Medicine Progress Note - Hospitalist Service    Date of Admission:  12/17/2024    Assessment & Plan        Skyler York is a 34 year old with no significant past medical history who came to the ER today with complaint of right lower back/gluteal pain radiating down to her foot associated with numbness and weakness and was found to have right central disc extrusion at L5-S1 abutting and displacing the transversing right S1 nerve root.     Problem list:  #Acute low back pain  #Acute sacral radiculopathy    Currently receiving intravenous dexamethasone-  -Round-the-clock APAP  - receiving gabapentin and Flexeril  -Highly appreciate input from neurosurgery service  -No immediate surgical approach  -Recommending MAXIMO.  IR evaluation requested yesterday but IR service here does not do MAXIMO anymore as per IR note  -Pain service input requested earlier and will await further input regarding recommendations for MAXIMO  -Minimize narcotics as able  -As needed bowel regimen  -Appreciate therapy input  -Will continue to monitor pain control and ambulation          Diet: Regular Diet Adult    DVT Prophylaxis: Pneumatic Compression Devices and Ambulate every shift  Laird Catheter: Not present  Lines: None     Cardiac Monitoring: None  Code Status: Full Code      Clinically Significant Risk Factors Present on Admission                             # Overweight: Estimated body mass index is 28.7 kg/m  as calculated from the following:    Height as of this encounter: 1.778 m (5' 10\").    Weight as of this encounter: 90.7 kg (200 lb).              Social Drivers of Health    Social Connections: Unknown (4/5/2023)    Social Connection and Isolation Panel [NHANES]     Frequency of Communication with Friends and Family: More than three times a week     Frequency of Social Gatherings with Friends and Family: Three times a week     Attends Anab[employer]ist Services: Patient declined     Active Member of Clubs or " Organizations: No     Marital Status:           Disposition Plan     Medically Ready for Discharge: Anticipated Tomorrow             Flako Barksdale MD, MD  Hospitalist Service  [facility]  Securely message with Reven[employer] (more info)  Text page via fintonic Paging/Directory   ______________________________________________________________________    Interval History   I assumed medicine service care today.  Chart reviewed.  Seen and examined.  Case discussed with nursing service.  I met this young gentleman this morning while he is laying comfortably in bed watching television.  He still having intermittent back pain requiring oral narcotics overnight.  Fortunately no issues of any foot drop, severe numbness or tingling sensation.  No mental status changes noted overnight.  Remained afebrile.  Currently not requiring oxygen support.  Tolerating oral diet with no nausea or vomiting.  Passing flatus.  Voiding freely.    Physical Exam   Vital Signs: Temp: 97.8  F (36.6  C) Temp src: Oral BP: 124/76 Pulse: 77   Resp: 16 SpO2: 100 % O2 Device: None (Room air)    Weight: 200 lbs 0 oz    HEENT; Atraumatic, normocephalic, pinkish conjuctiva, pupils bilateral reactive   Skin: warm and moist, no rashes  Lymphatics: no cervical or axillary lymphandenopathy  Lungs: equal chest expansion, clear to auscultation, no wheezes, no stridor, no crackles,   Heart: normal rate, normal rhythm, no rubs or gallops.   Abdomen: normal bowel sounds, no tenderness, no peritoneal signs, no guarding  Extremities: no deformities, no edema   Neuro; follow commands, alert and oriented x3, spontaneous speech, coherent, moves all extremities spontaneously  Psych; no hallucination, euthymic mood, not agitated      Medical Decision Making       Yes yes40 MINUTES SPENT BY ME on the date of service doing chart review, history, exam, documentation & further activities per the note.  MANAGEMENT DISCUSSED with the following over  the past 24 hours: Yes   NOTE(S)/MEDICAL RECORDS REVIEWED over the past 24 hours: yes       Data         Imaging results reviewed over the past 24 hrs:   No results found for this or any previous visit (from the past 24 hours).

## 2024-12-19 NOTE — DISCHARGE SUMMARY
"[facility]  Hospitalist Discharge Summary      Date of Admission:  12/17/2024  Date of Discharge:  12/19/2024  Discharging Provider: Flako Barksdale MD, MD  Discharge Service: Hospitalist Service    Discharge Diagnoses   \  #Acute low back pain  #Acute sacral radiculopathy    Clinically Significant Risk Factors     # Overweight: Estimated body mass index is 28.7 kg/m  as calculated from the following:    Height as of this encounter: 1.778 m (5' 10\").    Weight as of this encounter: 90.7 kg (200 lb).       Follow-ups Needed After Discharge   Follow-up Appointments       Follow-up and recommended labs and tests       Follow up with primary care provider, Deric Irwin, within 7 days to evaluate medication change, to evaluate treatment change, and for hospital follow- up.    Follow-up with pain service.  Follow-up with plan MAXIMO as arranged by pain service                Unresulted Labs Ordered in the Past 30 Days of this Admission       No orders found from 11/17/2024 to 12/18/2024.            Discharge Disposition   Discharged to home  Condition at discharge: Stable    Hospital Course     his is a case of a pleasant 34-year-old independently living gentleman with a prior history of low back pain in the past and has a presents in the hospital due to worsening low back pain and spinal trial of corticosteroids and pain regimen with NSAIDs at home.  He was found with sacral radiculopathy and seen and optimized from neurosurgical perspective with no immediate plans for any surgical approach.  Recommendations for an MAXIMO that will be pursued as outpatient as arranged by pain service here.  Currently demonstrating stable hemodynamics.  Tolerating oral diet.  Feeling better in terms of pain control.  Patient is hopeful for hospital discharge and pain service provide recommendations to continue gabapentin, muscle relaxants, finish his Medrol Dosepak and short course of as needed opioids.  Earlier " discussion ensued regarding indications, benefits with the use of opioids and its known side effects such as that in respiratory depression and potential for addiction and dependence if misuse and abuse.      Skyler York is a 34 year old with no significant past medical history who came to the ER today with complaint of right lower back/gluteal pain radiating down to her foot associated with numbness and weakness and was found to have right central disc extrusion at L5-S1 abutting and displacing the transversing right S1 nerve root.     Problem list:  #Acute low back pain  #Acute sacral radiculopathy    Currently receiving intravenous dexamethasone-  -Round-the-clock APAP  - receiving gabapentin and Flexeril  -Highly appreciate input from neurosurgery service  -No immediate surgical approach  -Recommending MAXIMO.  IR evaluation requested yesterday but IR service here does not do MAXIMO anymore as per IR note  -Pain service input requested earlier and will await further input regarding recommendations for MAXIMO  -Minimize narcotics as able  -As needed bowel regimen  -Appreciate therapy input  -Will continue to monitor pain control and ambulation    Consultations This Hospital Stay   PHYSICAL THERAPY ADULT IP CONSULT  OCCUPATIONAL THERAPY ADULT IP CONSULT  NEUROSURGERY IP CONSULT  INTERVENTIONAL RADIOLOGY ADULT/PEDS IP CONSULT  PAIN MANAGEMENT ADULT IP CONSULT    Code Status   Full Code    Time Spent on this Encounter   I, Flako Barksdale MD, MD, personally saw the patient today and spent greater than 30 minutes discharging this patient.       Flako Barksdale MD, MD  [facility] PEDIATRIC  [address] [place] [address]  Phone: [phone]  Fax: [phone]  ______________________________________________________________________    Physical Exam   Vital Signs: Temp: 97.8  F (36.6  C) Temp src: Oral BP: 124/76 Pulse: 77   Resp: 16 SpO2: 100 % O2 Device: None (Room air)    Weight: 200  lbs 0 oz  I will refer you to my earlier same-day progress note for same-day exam       Primary Care Physician   Deric Irwin    Discharge Orders      Pain Management  Referral      Reason for your hospital stay    This is a case of a pleasant 34-year-old independently living gentleman with a prior history of low back pain in the past and has a presents in the hospital due to worsening low back pain and spinal trial of corticosteroids and pain regimen with NSAIDs at home.  He was found with sacral radiculopathy and seen and optimized from neurosurgical perspective with no immediate plans for any surgical approach.  Recommendations for an MAXIMO that will be pursued as outpatient as arranged by pain service here.  Currently demonstrating stable hemodynamics.  Tolerating oral diet.  Feeling better in terms of pain control.  Patient is hopeful for hospital discharge and pain service provide recommendations to continue gabapentin, muscle relaxants, finish his Medrol Dosepak and short course of as needed opioids.  Earlier discussion ensued regarding indications, benefits with the use of opioids and its known side effects such as that in respiratory depression and potential for addiction and dependence if misuse and abuse.     Follow-up and recommended labs and tests     Follow up with primary care provider, Deric Irwin, within 7 days to evaluate medication change, to evaluate treatment change, and for hospital follow- up.    Follow-up with pain service.  Follow-up with plan MAXIMO as arranged by pain service     Activity    Your activity upon discharge: activity as tolerated  As per neurosurgery and pain service instructions     Full Code     Diet    Follow this diet upon discharge: Current Diet:Orders Placed This Encounter      Regular Diet Adult       Significant Results and Procedures   Most Recent 3 CBC's:  Recent Labs   Lab Test 12/17/24  2129 10/23/23  1740 04/25/21  1558   WBC 11.2* 5.7 5.2   HGB 12.9* 13.8  14.6   MCV 86 86 87    246 243     Most Recent 3 BMP's:  Recent Labs   Lab Test 12/17/24  2129 10/23/23  1740 02/16/22  1636    139 138   POTASSIUM 3.4 3.9 4.1   CHLORIDE 101 104 105   CO2 26 27 25   BUN 17.1 10.3 17   CR 1.18* 0.96 1.05   ANIONGAP 12 8 8   BHAVANI 8.9 9.0 8.9   * 94 86     Most Recent 3 Hemoglobins:  Recent Labs   Lab Test 12/17/24  2129 10/23/23  1740 04/25/21  1558   HGB 12.9* 13.8 14.6     Most Recent 3 Troponin's:  Recent Labs   Lab Test 04/25/21  1558   TROPI <0.015     Most Recent 3 BNP's:No lab results found.,   Results for orders placed or performed during the hospital encounter of 12/17/24   Lumbar spine MRI w/o contrast    Narrative    EXAM: MR LUMBAR SPINE W/O CONTRAST  LOCATION: [facility]  DATE: 12/17/2024    INDICATION: right sided numbness and weakness  COMPARISON: None.  TECHNIQUE: Routine Lumbar Spine MRI without IV contrast.    FINDINGS:   Nomenclature is based on 5 lumbar type vertebral bodies. Normal vertebral body heights, alignment and marrow signal. Small Schmorl's nodes along the superior endplates at L2 and L4. Normal distal spinal cord and cauda equina with conus medullaris at T12.   No extraspinal abnormality. Unremarkable visualized bony pelvis.    T12-L1: Normal disc height and signal. No herniation. Normal facets. No spinal canal or neural foraminal stenosis.     L1-L2: Normal disc height and signal. No herniation. Normal facets. No spinal canal or neural foraminal stenosis.    L2-L3: Normal disc height and signal. No herniation. Normal facets. No spinal canal or neural foraminal stenosis.     L3-L4: Normal disc height and signal. No herniation. Normal facets. No spinal canal or neural foraminal stenosis.    L4-L5: Normal disc height and signal. No herniation. Normal facets. No spinal canal or neural foraminal stenosis.    L5-S1: Disc desiccation with relative preservation of disc height. Moderate right central disc extrusion  extends just below the disc space, narrows the right lateral recess, and abuts and displaces the traversing right S1 nerve root. Normal facets. No   foraminal stenosis.      Impression    IMPRESSION:  1.  Right central disc extrusion at L5-S1 abuts and displaces the traversing right S1 nerve root and is the likely source of the patient's symptoms.       Discharge Medications   Current Discharge Medication List        START taking these medications    Details   gabapentin (NEURONTIN) 400 MG capsule Take 1 capsule (400 mg) by mouth 3 times daily.  Qty: 90 capsule, Refills: 0    Associated Diagnoses: Lumbar disc herniation      methocarbamol (ROBAXIN) 500 MG tablet Take 1 tablet (500 mg) by mouth 3 times daily as needed for muscle spasms.  Qty: 60 tablet, Refills: 0    Associated Diagnoses: Lumbar disc herniation      oxyCODONE (ROXICODONE) 5 MG tablet Take 1 tablet (5 mg) by mouth every 4 hours as needed for severe pain.  Qty: 12 tablet, Refills: 0    Associated Diagnoses: Lumbar disc herniation      polyethylene glycol (MIRALAX) 17 g packet Take 17 g by mouth daily as needed for constipation.  Qty: 15 packet, Refills: 0    Associated Diagnoses: Lumbar disc herniation      senna-docusate (SENOKOT-S/PERICOLACE) 8.6-50 MG tablet Take 1 tablet by mouth 2 times daily as needed for constipation.  Qty: 30 tablet, Refills: 0    Associated Diagnoses: Lumbar disc herniation           CONTINUE these medications which have NOT CHANGED    Details   acetaminophen (TYLENOL) 325 MG tablet Take 325-650 mg by mouth every 6 hours as needed for mild pain.      albuterol (PROAIR HFA/PROVENTIL HFA/VENTOLIN HFA) 108 (90 Base) MCG/ACT inhaler Inhale 2 puffs into the lungs every 6 hours as needed for shortness of breath, wheezing or cough.  Qty: 18 g, Refills: 0    Comments: Pharmacy may dispense brand covered by insurance (Proair, or proventil or ventolin or generic albuterol inhaler)  Associated Diagnoses: Mild intermittent asthma with  acute exacerbation      ipratropium - albuterol 0.5 mg/2.5 mg/3 mL (DUONEB) 0.5-2.5 (3) MG/3ML neb solution Take 1 vial (3 mLs) by nebulization every 6 hours as needed for shortness of breath, wheezing or cough.  Qty: 90 mL, Refills: 0    Associated Diagnoses: Mild intermittent asthma with acute exacerbation      methylPREDNISolone (MEDROL DOSEPAK) 4 MG tablet therapy pack Follow Package Directions  Qty: 21 tablet, Refills: 0           STOP taking these medications       cyclobenzaprine (FLEXERIL) 10 MG tablet Comments:   Reason for Stopping:         ibuprofen (ADVIL/MOTRIN) 200 MG tablet Comments:   Reason for Stopping:         naproxen sodium (ANAPROX) 220 MG tablet Comments:   Reason for Stopping:             Allergies   Allergies   Allergen Reactions    No Known Allergies

## 2024-12-19 NOTE — PLAN OF CARE
"VSS.  Pain controlled with po pain medication.  Up independently in room.  Ambulating in halls; some numbness to right foot.  Pain management following and increased gabapentin dose.  IV removed.  Tolerating diet.  Discharged home to self care.  Discharge instructions given; all questions answered.  Aware of follow up recommendations.  Problem: Adult Inpatient Plan of Care  Goal: Plan of Care Review  Description: The Plan of Care Review/Shift note should be completed every shift.  The Outcome Evaluation is a brief statement about your assessment that the patient is improving, declining, or no change.  This information will be displayed automatically on your shift  note.  Outcome: Progressing  Flowsheets (Taken 12/19/2024 1523)  Plan of Care Reviewed With: patient  Overall Patient Progress: improving  Goal: Patient-Specific Goal (Individualized)  Description: You can add care plan individualizations to a care plan. Examples of Individualization might be:  \"Parent requests to be called daily at 9am for status\", \"I have a hard time hearing out of my right ear\", or \"Do not touch me to wake me up as it startles  me\".  Outcome: Progressing  Goal: Absence of Hospital-Acquired Illness or Injury  Outcome: Progressing  Intervention: Identify and Manage Fall Risk  Recent Flowsheet Documentation  Taken 12/19/2024 0748 by Lindy Grajeda RN  Safety Promotion/Fall Prevention: clutter free environment maintained  Intervention: Prevent Skin Injury  Recent Flowsheet Documentation  Taken 12/19/2024 0748 by Lindy Grajeda RN  Body Position: (supine, RLE elevated)   position changed independently   lower extremity elevated   supine  Skin Protection: adhesive use limited  Intervention: Prevent and Manage VTE (Venous Thromboembolism) Risk  Recent Flowsheet Documentation  Taken 12/19/2024 0748 by Lindy Grajeda RN  VTE Prevention/Management: (up independently) SCDs off (sequential compression devices)  Goal: Optimal Comfort " and Wellbeing  Outcome: Progressing  Intervention: Monitor Pain and Promote Comfort  Recent Flowsheet Documentation  Taken 12/19/2024 0748 by Lindy Grajeda RN  Pain Management Interventions:   care clustered   medication (see MAR)  Goal: Readiness for Transition of Care  Outcome: Progressing     Problem: Pain Acute  Goal: Optimal Pain Control and Function  Outcome: Progressing  Intervention: Develop Pain Management Plan  Recent Flowsheet Documentation  Taken 12/19/2024 0748 by Lindy Grajeda RN  Pain Management Interventions:   care clustered   medication (see MAR)  Intervention: Prevent or Manage Pain  Recent Flowsheet Documentation  Taken 12/19/2024 0748 by Lindy Grajeda RN  Sensory Stimulation Regulation: care clustered  Medication Review/Management: medications reviewed   Goal Outcome Evaluation:      Plan of Care Reviewed With: patient    Overall Patient Progress: improvingOverall Patient Progress: improving

## 2024-12-21 ENCOUNTER — PATIENT OUTREACH (OUTPATIENT)
Dept: CARE COORDINATION | Facility: CLINIC | Age: 34
End: 2024-12-21
Payer: COMMERCIAL

## 2024-12-21 NOTE — PROGRESS NOTES
Connected Care Resource Center:   [facility] Resource Center Contact  [facility]/Voicemail     Clinical Data: Post-Discharge Outreach     Outreach attempted x 2.  Left message on patient's voicemail, providing [facility]'s central phone number of [phone] ([phone]) for questions/concerns and/or to schedule an appt with an [facility] provider, if they do not have a PCP.      Plan:  [facility] will do no further outreaches at this time.       Tawanna Tyler MA  Connected Care Resource Center, [facility]    *Connected Care Resource Team does NOT follow patient ongoing. Referrals are identified based on internal discharge reports and the outreach is to ensure patient has an understanding of their discharge instructions.

## 2024-12-23 ENCOUNTER — THERAPY VISIT (OUTPATIENT)
Dept: PHYSICAL THERAPY | Facility: CLINIC | Age: 34
End: 2024-12-23
Payer: COMMERCIAL

## 2024-12-23 DIAGNOSIS — M54.41 ACUTE RIGHT-SIDED LOW BACK PAIN WITH RIGHT-SIDED SCIATICA: Primary | ICD-10-CM

## 2024-12-23 PROCEDURE — 97110 THERAPEUTIC EXERCISES: CPT | Mod: GP | Performed by: PHYSICAL THERAPIST

## 2024-12-23 PROCEDURE — 97161 PT EVAL LOW COMPLEX 20 MIN: CPT | Mod: GP | Performed by: PHYSICAL THERAPIST

## 2024-12-23 NOTE — PROGRESS NOTES
PHYSICAL THERAPY EVALUATION  Type of Visit: Evaluation              Subjective     Pt describes right LBP, right buttock pain, and pain radiation down the right post lat leg to the ankle.  He also describes constant n/t in the right lat foot.  His sxs began on 12/13/24 when he sneezed while he was bent forward at the waist.  His pain was severe the first week rating the PL 8-9/10, but has reduced the last few days to a 2-3/10 PL.  He has been taking Prednisone, muscle relaxors, and Gabepentin.  MRI shows L5-S1 disk herniation.  Hx of similar sxs approximately 10 years ago.          Presenting condition or subjective complaint: (Patient-Rptd) Lower back pain with sciatica to the bottom right foot and toes  Date of onset: 12/13/24    Relevant medical history: (Patient-Rptd) Pain at night or rest   Dates & types of surgery:      Prior diagnostic imaging/testing results:       Prior therapy history for the same diagnosis, illness or injury: (Patient-Rptd) Yes (Patient-Rptd) In 3311-4937 time period needed therapy and chiropractor care    Prior Level of Function  Transfers: Independent  Ambulation: Independent  ADL: Independent  IADL:     Living Environment  Social support: (Patient-Rptd) With a significant other or spouse   Type of home: (Patient-Rptd) House   Stairs to enter the home: (Patient-Rptd) No       Ramp: (Patient-Rptd) No   Stairs inside the home: (Patient-Rptd) Yes (Patient-Rptd) 16 Is there a railing: (Patient-Rptd) Yes     Help at home: (Patient-Rptd) Self Cares (home health aide/personal care attendant, family, etc); Home management tasks (cooking, cleaning); Medication and/or finances; Assist for driving and community activities  Equipment owned:       Employment: (Patient-Rptd) Yes (Patient-Rptd)   Hobbies/Interests: (Patient-Rptd) Driving cars, playing with kids    Patient goals for therapy: (Patient-Rptd) Sit for work and drive    Pain assessment: See objective evaluation for  additional pain details     Objective   LUMBAR SPINE EVALUATION  PAIN: Pain Level at Rest: 2/10  Pain Level with Use: 8/10  Pain Location: right LB, buttock, post lat leg to ankle  Pain is Worst: mornings  Pain is Exacerbated By: sitting, bending, prolonged standing  INTEGUMENTARY (edema, incisions): WNL  POSTURE: WNL  GAIT:   Weightbearing Status: WBAT  Assistive Device(s): None  Gait Deviations: WNL  BALANCE/PROPRIOCEPTION:   WEIGHTBEARING ALIGNMENT: WNL  NON-WEIGHTBEARING ALIGNMENT: WNL   ROM:   (Degrees) Left AROM Left PROM  Right AROM Right PROM   Hip Flexion       Hip Extension       Hip Abduction       Hip Adduction       Hip Internal Rotation       Hip External Rotation       Knee Flexion       Knee Extension       Lumbar Side glide WNL WNL   Lumbar Flexion Signif restriction.  Fingers to knees with increased LBP   Lumbar Extension WNL.  YARELI and REIL with slight reduction in right foot n/t and no LB or leg pain.   Pain:   End feel:   PELVIC/SI SCREEN:   STRENGTH: WNL    MYOTOMES:    Left Right   T12-L3 (Hip Flexion)     L2-4 (Quads)  5 5   L4 (Ankle DF) 5 5   L5 (Great Toe Ext)     S1 (Toe Raise) 5 5-     DTR S:    Left Right   C5 (Biceps)     C6 (Brachioradialis)     C7 (Triceps)     L4 (Quad) 3 3   S1 (Achilles) 2 0     CORD SIGNS:   DERMATOMES:    Left Right   T12      L1     L2     L3     L4 Normal (light touch) Normal (light touch)   L5 Normal (light touch) Hypo (light touch)   S1 Normal (light touch) Hypo (light touch)     NEURAL TENSION:    Left Right   SLR Negative  Positive   SLR with DF     Femoral Nerve     Slump Negative  Positive   Bhupinder (Lumbar)     Bhupinder (Thoracic)     Bhupinder (Cervical)     Median     Ulnar     Radial        FLEXIBILITY:  HS inflexibility bilat  LUMBAR/HIP Special Tests:    PELVIS/SI SPECIAL TESTS:   FUNCTIONAL TESTS:   PALPATION:   SPINAL SEGMENTAL CONCLUSIONS:       Assessment & Plan   CLINICAL IMPRESSIONS  Medical Diagnosis: Acute sacral radiculopathy    Treatment Diagnosis:  LBP with right LE radiculopathy   Impression/Assessment: Patient is a 34 year old male with LB and right LE complaints.  The following significant findings have been identified: Pain, Decreased ROM/flexibility, Decreased strength, Impaired sensation, and Impaired muscle performance. These impairments interfere with their ability to perform self care tasks, work tasks, recreational activities, household chores, driving , and community mobility as compared to previous level of function.     Clinical Decision Making (Complexity):  Clinical Presentation: Stable/Uncomplicated  Clinical Presentation Rationale: based on medical and personal factors listed in PT evaluation  Clinical Decision Making (Complexity): Low complexity    PLAN OF CARE  Treatment Interventions:  Modalities: Cryotherapy, E-stim, Hot Pack  Interventions: Manual Therapy, Therapeutic Exercise    Long Term Goals     PT Goal 1  Goal Identifier: Sitting  Goal Description: Able to sit 30 minutes with no LBP  Rationale: to maximize safety and independence with performance of ADLs and functional tasks  Target Date: 02/03/25      Frequency of Treatment: 1x/week  Duration of Treatment: 8 weeks    Recommended Referrals to Other Professionals:   Education Assessment:        Risks and benefits of evaluation/treatment have been explained.   Patient/Family/caregiver agrees with Plan of Care.     Evaluation Time:     PT Eval, Low Complexity Minutes (31929): 25       Signing Clinician: Marc Evans, PT

## 2024-12-30 ENCOUNTER — THERAPY VISIT (OUTPATIENT)
Dept: PHYSICAL THERAPY | Facility: CLINIC | Age: 34
End: 2024-12-30
Payer: COMMERCIAL

## 2024-12-30 DIAGNOSIS — M54.41 ACUTE RIGHT-SIDED LOW BACK PAIN WITH RIGHT-SIDED SCIATICA: Primary | ICD-10-CM

## 2024-12-30 PROCEDURE — 97110 THERAPEUTIC EXERCISES: CPT | Mod: GP | Performed by: PHYSICAL THERAPIST

## 2024-12-31 ENCOUNTER — MYC MEDICAL ADVICE (OUTPATIENT)
Dept: FAMILY MEDICINE | Facility: CLINIC | Age: 34
End: 2024-12-31

## 2025-01-07 ENCOUNTER — THERAPY VISIT (OUTPATIENT)
Dept: PHYSICAL THERAPY | Facility: CLINIC | Age: 35
End: 2025-01-07
Payer: COMMERCIAL

## 2025-01-07 ENCOUNTER — MYC MEDICAL ADVICE (OUTPATIENT)
Dept: PALLIATIVE MEDICINE | Facility: CLINIC | Age: 35
End: 2025-01-07

## 2025-01-07 DIAGNOSIS — M54.41 ACUTE RIGHT-SIDED LOW BACK PAIN WITH RIGHT-SIDED SCIATICA: Primary | ICD-10-CM

## 2025-01-07 PROCEDURE — 97110 THERAPEUTIC EXERCISES: CPT | Mod: GP | Performed by: PHYSICAL THERAPIST

## 2025-01-10 SDOH — HEALTH STABILITY: PHYSICAL HEALTH: ON AVERAGE, HOW MANY DAYS PER WEEK DO YOU ENGAGE IN MODERATE TO STRENUOUS EXERCISE (LIKE A BRISK WALK)?: 3 DAYS

## 2025-01-10 SDOH — HEALTH STABILITY: PHYSICAL HEALTH: ON AVERAGE, HOW MANY MINUTES DO YOU ENGAGE IN EXERCISE AT THIS LEVEL?: 30 MIN

## 2025-01-10 ASSESSMENT — ASTHMA QUESTIONNAIRES
QUESTION_1 LAST FOUR WEEKS HOW MUCH OF THE TIME DID YOUR ASTHMA KEEP YOU FROM GETTING AS MUCH DONE AT WORK, SCHOOL OR AT HOME: NONE OF THE TIME
QUESTION_3 LAST FOUR WEEKS HOW OFTEN DID YOUR ASTHMA SYMPTOMS (WHEEZING, COUGHING, SHORTNESS OF BREATH, CHEST TIGHTNESS OR PAIN) WAKE YOU UP AT NIGHT OR EARLIER THAN USUAL IN THE MORNING: NOT AT ALL
ACT_TOTALSCORE: 25
QUESTION_5 LAST FOUR WEEKS HOW WOULD YOU RATE YOUR ASTHMA CONTROL: COMPLETELY CONTROLLED
QUESTION_4 LAST FOUR WEEKS HOW OFTEN HAVE YOU USED YOUR RESCUE INHALER OR NEBULIZER MEDICATION (SUCH AS ALBUTEROL): NOT AT ALL
QUESTION_2 LAST FOUR WEEKS HOW OFTEN HAVE YOU HAD SHORTNESS OF BREATH: NOT AT ALL
ACT_TOTALSCORE: 25

## 2025-01-10 ASSESSMENT — SOCIAL DETERMINANTS OF HEALTH (SDOH): HOW OFTEN DO YOU GET TOGETHER WITH FRIENDS OR RELATIVES?: ONCE A WEEK

## 2025-01-14 ENCOUNTER — THERAPY VISIT (OUTPATIENT)
Dept: PHYSICAL THERAPY | Facility: CLINIC | Age: 35
End: 2025-01-14
Payer: COMMERCIAL

## 2025-01-14 DIAGNOSIS — M54.41 ACUTE RIGHT-SIDED LOW BACK PAIN WITH RIGHT-SIDED SCIATICA: Primary | ICD-10-CM

## 2025-01-14 PROCEDURE — 97110 THERAPEUTIC EXERCISES: CPT | Mod: GP | Performed by: PHYSICAL THERAPIST

## 2025-01-14 PROCEDURE — 97140 MANUAL THERAPY 1/> REGIONS: CPT | Mod: GP | Performed by: PHYSICAL THERAPIST

## 2025-01-14 PROCEDURE — 97012 MECHANICAL TRACTION THERAPY: CPT | Mod: GP | Performed by: PHYSICAL THERAPIST

## 2025-01-15 ENCOUNTER — THERAPY VISIT (OUTPATIENT)
Dept: PHYSICAL THERAPY | Facility: CLINIC | Age: 35
End: 2025-01-15
Payer: COMMERCIAL

## 2025-01-15 ENCOUNTER — OFFICE VISIT (OUTPATIENT)
Dept: FAMILY MEDICINE | Facility: CLINIC | Age: 35
End: 2025-01-15
Payer: COMMERCIAL

## 2025-01-15 VITALS
RESPIRATION RATE: 16 BRPM | WEIGHT: 203.9 LBS | TEMPERATURE: 98.3 F | BODY MASS INDEX: 29.19 KG/M2 | HEART RATE: 73 BPM | HEIGHT: 70 IN | OXYGEN SATURATION: 100 % | DIASTOLIC BLOOD PRESSURE: 82 MMHG | SYSTOLIC BLOOD PRESSURE: 124 MMHG

## 2025-01-15 DIAGNOSIS — Z00.00 ROUTINE GENERAL MEDICAL EXAMINATION AT A HEALTH CARE FACILITY: Primary | ICD-10-CM

## 2025-01-15 DIAGNOSIS — M54.41 ACUTE RIGHT-SIDED LOW BACK PAIN WITH RIGHT-SIDED SCIATICA: Primary | ICD-10-CM

## 2025-01-15 DIAGNOSIS — M51.27 HERNIATION OF INTERVERTEBRAL DISC BETWEEN L5 AND S1: ICD-10-CM

## 2025-01-15 PROCEDURE — 90471 IMMUNIZATION ADMIN: CPT | Performed by: FAMILY MEDICINE

## 2025-01-15 PROCEDURE — 97110 THERAPEUTIC EXERCISES: CPT | Mod: GP | Performed by: PHYSICAL THERAPIST

## 2025-01-15 PROCEDURE — 97140 MANUAL THERAPY 1/> REGIONS: CPT | Mod: GP | Performed by: PHYSICAL THERAPIST

## 2025-01-15 PROCEDURE — 90656 IIV3 VACC NO PRSV 0.5 ML IM: CPT | Performed by: FAMILY MEDICINE

## 2025-01-15 PROCEDURE — 99395 PREV VISIT EST AGE 18-39: CPT | Mod: 25 | Performed by: FAMILY MEDICINE

## 2025-01-15 PROCEDURE — 97012 MECHANICAL TRACTION THERAPY: CPT | Mod: GP | Performed by: PHYSICAL THERAPIST

## 2025-01-15 NOTE — PROGRESS NOTES
"Preventive Care Visit  [facility]  Deric Irwin MD, Family Medicine  Jovanni 15, 2025      Assessment & Plan     Routine general medical examination at a health care facility    Herniation of intervertebral disc between L5 and S1  - Adult Neurosurgery  Referral              BMI  Estimated body mass index is 29.26 kg/m  as calculated from the following:    Height as of this encounter: 1.778 m (5' 10\").    Weight as of this encounter: 92.5 kg (203 lb 14.4 oz).   Weight management plan: Discussed healthy diet and exercise guidelines    Counseling  Appropriate preventive services were addressed with this patient via screening, questionnaire, or discussion as appropriate for fall prevention, nutrition, physical activity, Tobacco-use cessation, social engagement, weight loss and cognition.  Checklist reviewing preventive services available has been given to the patient.  Reviewed patient's diet, addressing concerns and/or questions.   He is at risk for lack of exercise and has been provided with information to increase physical activity for the benefit of his well-being.           Abbe Holland is a 34 year old, presenting for the following:  Physical (Male Physical - Patient is fasting) and Back Injury (- 12/13/24; Herniated Disc)        1/15/2025     7:20 AM   Additional Questions   Roomed by CATHIE Lopez   Accompanied by Self          HPI    Health Care Directive  Patient does not have a Health Care Directive: Discussed advance care planning with patient; however, patient declined at this time.      1/10/2025   General Health   How would you rate your overall physical health? (!) FAIR   Feel stress (tense, anxious, or unable to sleep) Only a little   (!) STRESS CONCERN      1/10/2025   Nutrition   Three or more servings of calcium each day? Yes   Diet: Regular (no restrictions)   How many servings of fruit and vegetables per day? (!) 2-3   How many sweetened beverages each " day? 0-1         1/10/2025   Exercise   Days per week of moderate/strenous exercise 3 days   Average minutes spent exercising at this level 30 min         1/10/2025   Social Factors   Frequency of gathering with friends or relatives Once a week   Worry food won't last until get money to buy more No   Food not last or not have enough money for food? No   Do you have housing? (Housing is defined as stable permanent housing and does not include staying ouside in a car, in a tent, in an abandoned building, in an overnight shelter, or couch-surfing.) No   Are you worried about losing your housing? No   Lack of transportation? No   Unable to get utilities (heat,electricity)? No   Want help with housing or utility concern? No   (!) HOUSING CONCERN PRESENT      1/10/2025   Dental   Dentist two times every year? Yes            Today's PHQ-2 Score:       1/14/2025     7:40 AM   PHQ-2 ( 1999 [employer])   Q1: Little interest or pleasure in doing things 1   Q2: Feeling down, depressed or hopeless 1   PHQ-2 Score 2    Q1: Little interest or pleasure in doing things Several days   Q2: Feeling down, depressed or hopeless Several days   PHQ-2 Score 2       Patient-reported           1/10/2025   Substance Use   Alcohol more than 3/day or more than 7/wk No   Do you use any other substances recreationally? No     Social History     Tobacco Use    Smoking status: Never     Passive exposure: Never    Smokeless tobacco: Never   Vaping Use    Vaping status: Never Used   Substance Use Topics    Alcohol use: Never    Drug use: No           1/10/2025   STI Screening   New sexual partner(s) since last STI/HIV test? No         1/10/2025   Contraception/Family Planning   Questions about contraception or family planning No        Reviewed and updated as needed this visit by Provider                             Objective    Exam  /82 (BP Location: Right arm, Patient Position: Sitting, Cuff Size: Adult Large)   Pulse 73   Temp 98.3  F (36.8  " C) (Oral)   Resp 16   Ht 1.778 m (5' 10\")   Wt 92.5 kg (203 lb 14.4 oz)   SpO2 100%   BMI 29.26 kg/m     Estimated body mass index is 29.26 kg/m  as calculated from the following:    Height as of this encounter: 1.778 m (5' 10\").    Weight as of this encounter: 92.5 kg (203 lb 14.4 oz).    Physical Exam  Vitals reviewed.   Constitutional:       General: He is not in acute distress.     Appearance: Normal appearance. He is not ill-appearing.   HENT:      Head: Normocephalic and atraumatic.      Right Ear: External ear normal.      Left Ear: External ear normal.      Nose: Nose normal.      Mouth/Throat:      Mouth: Mucous membranes are moist.      Pharynx: Oropharynx is clear.   Eyes:      General: No scleral icterus.        Right eye: No discharge.         Left eye: No discharge.      Extraocular Movements: Extraocular movements intact.      Pupils: Pupils are equal, round, and reactive to light.   Neck:      Vascular: No JVD.   Cardiovascular:      Rate and Rhythm: Normal rate and regular rhythm.   Pulmonary:      Effort: Pulmonary effort is normal. No respiratory distress.      Breath sounds: Normal breath sounds.   Abdominal:      General: Abdomen is flat. Bowel sounds are normal.      Palpations: Abdomen is soft.   Musculoskeletal:         General: No swelling.      Cervical back: Normal range of motion.   Lymphadenopathy:      Cervical: No cervical adenopathy.   Skin:     General: Skin is warm.      Capillary Refill: Capillary refill takes less than 2 seconds.   Neurological:      Mental Status: He is alert.   Psychiatric:         Mood and Affect: Mood normal.         Behavior: Behavior normal.               Signed Electronically by: Deric Irwin MD    "

## 2025-01-15 NOTE — PATIENT INSTRUCTIONS
Patient Education   Preventive Care Advice   This is general advice given by our system to help you stay healthy. However, your care team may have specific advice just for you. Please talk to your care team about your preventive care needs.  Nutrition  Eat 5 or more servings of fruits and vegetables each day.  Try wheat bread, brown rice and whole grain pasta (instead of white bread, rice, and pasta).  Get enough calcium and vitamin D. Check the label on foods and aim for 100% of the RDA (recommended daily allowance).  Lifestyle  Exercise at least 150 minutes each week  (30 minutes a day, 5 days a week).  Do muscle strengthening activities 2 days a week. These help control your weight and prevent disease.  No smoking.  Wear sunscreen to prevent skin cancer.  Have a dental exam and cleaning every 6 months.  Yearly exams  See your health care team every year to talk about:  Any changes in your health.  Any medicines your care team has prescribed.  Preventive care, family planning, and ways to prevent chronic diseases.  Shots (vaccines)   HPV shots (up to age 26), if you've never had them before.  Hepatitis B shots (up to age 59), if you've never had them before.  COVID-19 shot: Get this shot when it's due.  Flu shot: Get a flu shot every year.  Tetanus shot: Get a tetanus shot every 10 years.  Pneumococcal, hepatitis A, and RSV shots: Ask your care team if you need these based on your risk.  Shingles shot (for age 50 and up)  General health tests  Diabetes screening:  Starting at age 35, Get screened for diabetes at least every 3 years.  If you are younger than age 35, ask your care team if you should be screened for diabetes.  Cholesterol test: At age 39, start having a cholesterol test every 5 years, or more often if advised.  Bone density scan (DEXA): At age 50, ask your care team if you should have this scan for osteoporosis (brittle bones).  Hepatitis C: Get tested at least once in your life.  STIs (sexually  transmitted infections)  Before age 24: Ask your care team if you should be screened for STIs.  After age 24: Get screened for STIs if you're at risk. You are at risk for STIs (including HIV) if:  You are sexually active with more than one person.  You don't use condoms every time.  You or a partner was diagnosed with a sexually transmitted infection.  If you are at risk for HIV, ask about PrEP medicine to prevent HIV.  Get tested for HIV at least once in your life, whether you are at risk for HIV or not.  Cancer screening tests  Cervical cancer screening: If you have a cervix, begin getting regular cervical cancer screening tests starting at age 21.  Breast cancer scan (mammogram): If you've ever had breasts, begin having regular mammograms starting at age 40. This is a scan to check for breast cancer.  Colon cancer screening: It is important to start screening for colon cancer at age 45.  Have a colonoscopy test every 10 years (or more often if you're at risk) Or, ask your provider about stool tests like a FIT test every year or Cologuard test every 3 years.  To learn more about your testing options, visit:   .  For help making a decision, visit:   https://bit.ly/ly04830.  Prostate cancer screening test: If you have a prostate, ask your care team if a prostate cancer screening test (PSA) at age 55 is right for you.  Lung cancer screening: If you are a current or former smoker ages 50 to 80, ask your care team if ongoing lung cancer screenings are right for you.  For informational purposes only. Not to replace the advice of your health care provider. Copyright   2023 [facility]. All rights reserved. Clinically reviewed by the [facility]. vIPtela [id number] - REV 01/24.

## 2025-01-21 ENCOUNTER — THERAPY VISIT (OUTPATIENT)
Dept: PHYSICAL THERAPY | Facility: CLINIC | Age: 35
End: 2025-01-21
Payer: COMMERCIAL

## 2025-01-21 DIAGNOSIS — M54.41 ACUTE RIGHT-SIDED LOW BACK PAIN WITH RIGHT-SIDED SCIATICA: Primary | ICD-10-CM

## 2025-01-21 PROCEDURE — 97140 MANUAL THERAPY 1/> REGIONS: CPT | Mod: GP | Performed by: PHYSICAL THERAPIST

## 2025-01-21 PROCEDURE — 97012 MECHANICAL TRACTION THERAPY: CPT | Mod: GP | Performed by: PHYSICAL THERAPIST

## 2025-01-21 PROCEDURE — 97110 THERAPEUTIC EXERCISES: CPT | Mod: GP | Performed by: PHYSICAL THERAPIST

## 2025-01-23 ENCOUNTER — THERAPY VISIT (OUTPATIENT)
Dept: PHYSICAL THERAPY | Facility: CLINIC | Age: 35
End: 2025-01-23
Payer: COMMERCIAL

## 2025-01-23 DIAGNOSIS — M54.41 ACUTE RIGHT-SIDED LOW BACK PAIN WITH RIGHT-SIDED SCIATICA: Primary | ICD-10-CM

## 2025-01-28 ENCOUNTER — THERAPY VISIT (OUTPATIENT)
Dept: PHYSICAL THERAPY | Facility: CLINIC | Age: 35
End: 2025-01-28
Payer: COMMERCIAL

## 2025-01-28 DIAGNOSIS — M54.41 ACUTE RIGHT-SIDED LOW BACK PAIN WITH RIGHT-SIDED SCIATICA: Primary | ICD-10-CM

## 2025-01-28 PROCEDURE — 97140 MANUAL THERAPY 1/> REGIONS: CPT | Mod: GP | Performed by: PHYSICAL THERAPIST

## 2025-01-28 PROCEDURE — 97110 THERAPEUTIC EXERCISES: CPT | Mod: GP | Performed by: PHYSICAL THERAPIST

## 2025-01-28 PROCEDURE — 97012 MECHANICAL TRACTION THERAPY: CPT | Mod: GP | Performed by: PHYSICAL THERAPIST

## 2025-01-30 ENCOUNTER — THERAPY VISIT (OUTPATIENT)
Dept: PHYSICAL THERAPY | Facility: CLINIC | Age: 35
End: 2025-01-30
Payer: COMMERCIAL

## 2025-01-30 DIAGNOSIS — M54.41 ACUTE RIGHT-SIDED LOW BACK PAIN WITH RIGHT-SIDED SCIATICA: Primary | ICD-10-CM

## 2025-02-04 ENCOUNTER — THERAPY VISIT (OUTPATIENT)
Dept: PHYSICAL THERAPY | Facility: CLINIC | Age: 35
End: 2025-02-04
Payer: COMMERCIAL

## 2025-02-04 DIAGNOSIS — M54.41 ACUTE RIGHT-SIDED LOW BACK PAIN WITH RIGHT-SIDED SCIATICA: Primary | ICD-10-CM

## 2025-02-04 PROCEDURE — 97110 THERAPEUTIC EXERCISES: CPT | Mod: GP | Performed by: PHYSICAL THERAPIST

## 2025-02-04 PROCEDURE — 97012 MECHANICAL TRACTION THERAPY: CPT | Mod: GP | Performed by: PHYSICAL THERAPIST

## 2025-02-04 PROCEDURE — 97140 MANUAL THERAPY 1/> REGIONS: CPT | Mod: GP | Performed by: PHYSICAL THERAPIST

## 2025-02-11 ENCOUNTER — THERAPY VISIT (OUTPATIENT)
Dept: PHYSICAL THERAPY | Facility: CLINIC | Age: 35
End: 2025-02-11
Payer: COMMERCIAL

## 2025-02-11 DIAGNOSIS — M54.41 ACUTE RIGHT-SIDED LOW BACK PAIN WITH RIGHT-SIDED SCIATICA: Primary | ICD-10-CM

## 2025-02-11 PROCEDURE — 97140 MANUAL THERAPY 1/> REGIONS: CPT | Mod: GP | Performed by: PHYSICAL THERAPIST

## 2025-02-11 PROCEDURE — 97110 THERAPEUTIC EXERCISES: CPT | Mod: GP | Performed by: PHYSICAL THERAPIST

## 2025-02-11 PROCEDURE — 97012 MECHANICAL TRACTION THERAPY: CPT | Mod: GP | Performed by: PHYSICAL THERAPIST

## 2025-02-17 ENCOUNTER — THERAPY VISIT (OUTPATIENT)
Dept: PHYSICAL THERAPY | Facility: CLINIC | Age: 35
End: 2025-02-17
Payer: COMMERCIAL

## 2025-02-17 DIAGNOSIS — M54.41 ACUTE RIGHT-SIDED LOW BACK PAIN WITH RIGHT-SIDED SCIATICA: Primary | ICD-10-CM

## 2025-02-17 PROCEDURE — 97140 MANUAL THERAPY 1/> REGIONS: CPT | Mod: GP | Performed by: PHYSICAL THERAPIST

## 2025-02-17 PROCEDURE — 97012 MECHANICAL TRACTION THERAPY: CPT | Mod: GP | Performed by: PHYSICAL THERAPIST

## 2025-02-17 PROCEDURE — 97110 THERAPEUTIC EXERCISES: CPT | Mod: GP | Performed by: PHYSICAL THERAPIST

## 2025-02-20 ENCOUNTER — THERAPY VISIT (OUTPATIENT)
Dept: PHYSICAL THERAPY | Facility: CLINIC | Age: 35
End: 2025-02-20
Payer: COMMERCIAL

## 2025-02-20 DIAGNOSIS — M54.41 ACUTE RIGHT-SIDED LOW BACK PAIN WITH RIGHT-SIDED SCIATICA: Primary | ICD-10-CM

## 2025-03-13 ENCOUNTER — THERAPY VISIT (OUTPATIENT)
Dept: PHYSICAL THERAPY | Facility: CLINIC | Age: 35
End: 2025-03-13
Payer: COMMERCIAL

## 2025-03-13 DIAGNOSIS — M54.41 ACUTE RIGHT-SIDED LOW BACK PAIN WITH RIGHT-SIDED SCIATICA: Primary | ICD-10-CM

## 2025-03-27 ENCOUNTER — THERAPY VISIT (OUTPATIENT)
Dept: PHYSICAL THERAPY | Facility: CLINIC | Age: 35
End: 2025-03-27
Payer: COMMERCIAL

## 2025-03-27 DIAGNOSIS — M54.41 ACUTE RIGHT-SIDED LOW BACK PAIN WITH RIGHT-SIDED SCIATICA: Primary | ICD-10-CM

## 2025-04-10 ENCOUNTER — THERAPY VISIT (OUTPATIENT)
Dept: PHYSICAL THERAPY | Facility: CLINIC | Age: 35
End: 2025-04-10
Payer: COMMERCIAL

## 2025-04-10 DIAGNOSIS — M54.41 ACUTE RIGHT-SIDED LOW BACK PAIN WITH RIGHT-SIDED SCIATICA: Primary | ICD-10-CM

## 2025-05-08 ENCOUNTER — THERAPY VISIT (OUTPATIENT)
Dept: PHYSICAL THERAPY | Facility: CLINIC | Age: 35
End: 2025-05-08
Payer: COMMERCIAL

## 2025-05-08 DIAGNOSIS — M54.41 ACUTE RIGHT-SIDED LOW BACK PAIN WITH RIGHT-SIDED SCIATICA: Primary | ICD-10-CM

## 2025-05-08 NOTE — PROGRESS NOTES
05/08/25 0500   Appointment Info   Signing clinician's name / credentials Marc Evans, PT   Total/Authorized Visits 8 (ET)   Visits Used 18   Medical Diagnosis Acute sacral radiculopathy   PT Tx Diagnosis LBP with right LE radiculopathy   Progress Note/Certification   Onset of illness/injury or Date of Surgery 12/13/24   Therapy Frequency 1x/week   Predicted Duration 8 weeks   Progress Note Completed Date 05/08/25   PT Goal 1   Goal Identifier Sitting   Goal Description Able to sit 30 minutes with no LBP   Rationale to maximize safety and independence with performance of ADLs and functional tasks   Goal Progress Met, but recent exacerbation has caused some LBP with sitting.   Target Date 03/31/25   Date Met 05/08/25   Subjective Report   Subjective Report Was doing well until he tweaked his low back on Monday while rotating tires on his car and doing other work on his car.  Had also just returned from a road trip in which he drove 2000+ miles.  Occasionaly has cramping in his right calf and thigh and also n/t in the right plantar surface of his foot.   Objective Measures   Objective Measures Objective Measure 1   Objective Measure 1   Objective Measure Trunk ROM: flex to mid shin, full ext.  RFIS did not exacerbate sxs.   PT Modalities   PT Modalities Mechanical Traction   Mechanical Traction   Mechanical Traction, Minutes (53123) 20   Traction -Type Lumbar   Position supine   Type Intermittent   Duration 15 min   Max poundage 100   Min poundage 50   Hold Time 60   Rest Time 20   Steps ramping up 1   Steps ramping down 1   Speed 50%   Treatment Interventions (PT)   Interventions Therapeutic Procedure/Exercise;Manual Therapy;Therapeutic Activity   Therapeutic Procedure/Exercise   Therapeutic Procedures: strength, endurance, ROM, flexibility minutes (06752) 30   Therapeutic Procedures Ther Proc 2;Ther Proc 3;Ther Proc 4;Ther Proc 5;Ther Proc 6;Ther Proc 7;Ther Proc 8;Ther Proc 9;Ther Proc 10   Ther Proc 1 Press  "ups   Ther Proc 1 - Details 10x   Ther Proc 2 Planks: Prone and SL   Ther Proc 2 - Details 30\" each x 1   Ther Proc 3 SL toe raises/ Gastroc str   Ther Proc 3 - Details 2x10; 3x10\"   Ther Proc 4 Press ups with manual OP   Ther Proc 4 - Details 10x   Ther Proc 5 Ball exer: Walk out bridge with arms overhead   Ther Proc 5 - Details 3x10 with blue ball in hands   Ther Proc 6 Abd exer #9   Ther Proc 6 - Details 2x30   Ther Proc 7 Bridging   Ther Proc 7 - Details 2x15   Ther Proc 8 Supine HS str with ankle pumps for nerve flossing/ Supine HS stretch at wall   Ther Proc 8 - Details 3x10/3x15-30\"   Ther Proc 9 Ball exer: Prone AUE/LE; Swimming   Ther Proc 9 - Details 20x5\"; 2x30   Ther Proc 10 Ball exer: Bridging with legs straight   Ther Proc 10 - Details 10x10\"   Therapeutic Activity   Therapeutic Activities Ther Act 2;Ther Act 3;Ther Act 4;Ther Act 5;Ther Act 6   Ther Act 1 DKC/SKC   Ther Act 1 - Details 5x5\"/3x10\"   Ther Act 2 Supine QL str   Ther Act 2 - Details 3x10\"   Ther Act 3 Supine piriformis str >90   Ther Act 3 - Details 3x10\"   Manual Therapy   Manual Therapy Manual Therapy 2   Manual Therapy 1 Lumbar PA's   Manual Therapy 1 - Details Gr 4 x 2 min   Manual Therapy 2 Manual OP with press ups   Manual Therapy 2 - Details Manual supine HS stretch: 3x15\" bilat   Plan   Plan for next session Cont with HEP.   Total Session Time   Timed Code Treatment Minutes 30   Total Treatment Time (sum of timed and untimed services) 50       DISCHARGE  Reason for Discharge: Pt is doing well and independent with a HEP.    Equipment Issued: none    Discharge Plan: Patient to continue home program.    Referring Provider:  Rocio Rosas   "

## 2025-05-21 NOTE — PATIENT INSTRUCTIONS
Below is some helpful information about your upcoming surgery.  Please arrive at [facility] ([address]) at 6:30 am on 5-27-25.  Please do not eat after midnight, the night prior to your surgery.  It is ok to drink clear liquids up until 6:30 am.  Some examples of clear liquids include: water, apple juice, jello or black coffee.   Please don’t drink anything with pulp such as, orange juice.    Please take the following medication the morning of surgery: Lexapro.  Please bring medications that you take in their original prescription bottles the day of surgery.  Bring any cases for your contact lens, dentures, partials or glasses.  Leave any valuables at home and remove all jewelry prior to your arrival.  Please don't wear any make-up or hair product the morning of surgery.    Bring your insurance card and a photo ID. Stop at the registration desk when you are arrive.   Make sure you have arranged for someone to bring you home.     If you were to become ill prior to your surgery, please contact your family care physician.  The quality of your stay is important to us.  We want to ensure you have excellent care during your stay.  Please don’t hesitate to call with any questions about your surgery at [phone] (hours of operation are 8am-4pm Monday-Friday).  We look forward to caring for you!    Sincerely,   The Pre-surgical Evaluation Department     Start using nebs several times a day.     If you have persistent symptoms start course of prednisone. Take in morning daily.     Use albuterol inhaler.

## 2025-08-25 ENCOUNTER — THERAPY VISIT (OUTPATIENT)
Dept: PHYSICAL THERAPY | Facility: CLINIC | Age: 35
End: 2025-08-25
Payer: COMMERCIAL

## 2025-08-25 DIAGNOSIS — M54.41 ACUTE RIGHT-SIDED LOW BACK PAIN WITH RIGHT-SIDED SCIATICA: Primary | ICD-10-CM

## 2025-08-25 PROCEDURE — 97140 MANUAL THERAPY 1/> REGIONS: CPT | Mod: GP | Performed by: PHYSICAL THERAPIST

## 2025-08-25 PROCEDURE — 97035 APP MDLTY 1+ULTRASOUND EA 15: CPT | Mod: GP | Performed by: PHYSICAL THERAPIST

## 2025-08-25 PROCEDURE — 97012 MECHANICAL TRACTION THERAPY: CPT | Mod: GP | Performed by: PHYSICAL THERAPIST
